# Patient Record
Sex: FEMALE | Race: WHITE | NOT HISPANIC OR LATINO
[De-identification: names, ages, dates, MRNs, and addresses within clinical notes are randomized per-mention and may not be internally consistent; named-entity substitution may affect disease eponyms.]

---

## 2019-10-24 ENCOUNTER — APPOINTMENT (OUTPATIENT)
Dept: INTERNAL MEDICINE | Facility: CLINIC | Age: 79
End: 2019-10-24
Payer: MEDICARE

## 2019-10-24 ENCOUNTER — NON-APPOINTMENT (OUTPATIENT)
Age: 79
End: 2019-10-24

## 2019-10-24 VITALS
OXYGEN SATURATION: 98 % | WEIGHT: 178 LBS | TEMPERATURE: 98.1 F | BODY MASS INDEX: 31.54 KG/M2 | SYSTOLIC BLOOD PRESSURE: 199 MMHG | HEART RATE: 87 BPM | DIASTOLIC BLOOD PRESSURE: 73 MMHG | HEIGHT: 63 IN

## 2019-10-24 VITALS — SYSTOLIC BLOOD PRESSURE: 150 MMHG | DIASTOLIC BLOOD PRESSURE: 80 MMHG

## 2019-10-24 DIAGNOSIS — Z80.0 FAMILY HISTORY OF MALIGNANT NEOPLASM OF DIGESTIVE ORGANS: ICD-10-CM

## 2019-10-24 DIAGNOSIS — Z81.1 FAMILY HISTORY OF ALCOHOL ABUSE AND DEPENDENCE: ICD-10-CM

## 2019-10-24 DIAGNOSIS — Z78.9 OTHER SPECIFIED HEALTH STATUS: ICD-10-CM

## 2019-10-24 DIAGNOSIS — Z63.4 DISAPPEARANCE AND DEATH OF FAMILY MEMBER: ICD-10-CM

## 2019-10-24 DIAGNOSIS — Z80.1 FAMILY HISTORY OF MALIGNANT NEOPLASM OF TRACHEA, BRONCHUS AND LUNG: ICD-10-CM

## 2019-10-24 PROCEDURE — 93000 ELECTROCARDIOGRAM COMPLETE: CPT

## 2019-10-24 PROCEDURE — 99204 OFFICE O/P NEW MOD 45 MIN: CPT | Mod: 25

## 2019-10-24 RX ORDER — MELATONIN 3 MG
TABLET ORAL
Refills: 0 | Status: ACTIVE | COMMUNITY

## 2019-10-24 RX ORDER — UBIDECARENONE 100 MG
100 CAPSULE ORAL
Refills: 0 | Status: ACTIVE | COMMUNITY

## 2019-10-24 SDOH — SOCIAL STABILITY - SOCIAL INSECURITY: DISSAPEARANCE AND DEATH OF FAMILY MEMBER: Z63.4

## 2019-11-20 ENCOUNTER — MEDICATION RENEWAL (OUTPATIENT)
Age: 79
End: 2019-11-20

## 2019-11-26 ENCOUNTER — MEDICATION RENEWAL (OUTPATIENT)
Age: 79
End: 2019-11-26

## 2019-12-29 ENCOUNTER — FORM ENCOUNTER (OUTPATIENT)
Age: 79
End: 2019-12-29

## 2019-12-30 ENCOUNTER — OUTPATIENT (OUTPATIENT)
Dept: OUTPATIENT SERVICES | Facility: HOSPITAL | Age: 79
LOS: 1 days | End: 2019-12-30
Payer: MEDICARE

## 2019-12-30 ENCOUNTER — APPOINTMENT (OUTPATIENT)
Dept: ULTRASOUND IMAGING | Facility: CLINIC | Age: 79
End: 2019-12-30
Payer: MEDICARE

## 2019-12-30 ENCOUNTER — APPOINTMENT (OUTPATIENT)
Dept: INTERNAL MEDICINE | Facility: CLINIC | Age: 79
End: 2019-12-30
Payer: MEDICARE

## 2019-12-30 VITALS — DIASTOLIC BLOOD PRESSURE: 80 MMHG | SYSTOLIC BLOOD PRESSURE: 150 MMHG

## 2019-12-30 VITALS
HEIGHT: 63 IN | HEART RATE: 103 BPM | OXYGEN SATURATION: 98 % | SYSTOLIC BLOOD PRESSURE: 150 MMHG | DIASTOLIC BLOOD PRESSURE: 80 MMHG | BODY MASS INDEX: 31.01 KG/M2 | TEMPERATURE: 98.4 F | WEIGHT: 175 LBS

## 2019-12-30 DIAGNOSIS — R10.11 RIGHT UPPER QUADRANT PAIN: ICD-10-CM

## 2019-12-30 DIAGNOSIS — Z76.89 PERSONS ENCOUNTERING HEALTH SERVICES IN OTHER SPECIFIED CIRCUMSTANCES: ICD-10-CM

## 2019-12-30 PROCEDURE — 99215 OFFICE O/P EST HI 40 MIN: CPT

## 2019-12-30 PROCEDURE — 76700 US EXAM ABDOM COMPLETE: CPT

## 2019-12-30 PROCEDURE — 76700 US EXAM ABDOM COMPLETE: CPT | Mod: 26

## 2019-12-30 RX ORDER — BLOOD SUGAR DIAGNOSTIC
STRIP MISCELLANEOUS
Qty: 100 | Refills: 0 | Status: ACTIVE | COMMUNITY
Start: 2019-07-16

## 2020-01-03 ENCOUNTER — OUTPATIENT (OUTPATIENT)
Dept: OUTPATIENT SERVICES | Facility: HOSPITAL | Age: 80
LOS: 1 days | End: 2020-01-03
Payer: MEDICARE

## 2020-01-03 DIAGNOSIS — R10.11 RIGHT UPPER QUADRANT PAIN: ICD-10-CM

## 2020-01-03 PROCEDURE — 78227 HEPATOBIL SYST IMAGE W/DRUG: CPT | Mod: 26

## 2020-01-03 PROCEDURE — 78227 HEPATOBIL SYST IMAGE W/DRUG: CPT

## 2020-01-03 PROCEDURE — A9537: CPT

## 2020-01-03 RX ORDER — SINCALIDE 5 UG/5ML
1.6 INJECTION, POWDER, LYOPHILIZED, FOR SOLUTION INTRAVENOUS ONCE
Refills: 0 | Status: COMPLETED | OUTPATIENT
Start: 2020-01-03 | End: 2020-01-03

## 2020-01-03 RX ADMIN — SINCALIDE 51.6 MICROGRAM(S): 5 INJECTION, POWDER, LYOPHILIZED, FOR SOLUTION INTRAVENOUS at 11:52

## 2020-01-06 ENCOUNTER — TRANSCRIPTION ENCOUNTER (OUTPATIENT)
Age: 80
End: 2020-01-06

## 2020-01-27 ENCOUNTER — APPOINTMENT (OUTPATIENT)
Dept: INTERNAL MEDICINE | Facility: CLINIC | Age: 80
End: 2020-01-27

## 2020-02-19 ENCOUNTER — INPATIENT (INPATIENT)
Facility: HOSPITAL | Age: 80
LOS: 6 days | Discharge: HOME | DRG: 372 | End: 2020-02-26
Attending: SPECIALIST | Admitting: SPECIALIST
Payer: MEDICARE

## 2020-02-19 VITALS
WEIGHT: 158.95 LBS | TEMPERATURE: 98 F | HEART RATE: 89 BPM | DIASTOLIC BLOOD PRESSURE: 63 MMHG | OXYGEN SATURATION: 99 % | SYSTOLIC BLOOD PRESSURE: 148 MMHG | HEIGHT: 64 IN | RESPIRATION RATE: 16 BRPM

## 2020-02-19 DIAGNOSIS — K65.1 PERITONEAL ABSCESS: ICD-10-CM

## 2020-02-19 DIAGNOSIS — Z98.51 TUBAL LIGATION STATUS: Chronic | ICD-10-CM

## 2020-02-19 LAB
ALBUMIN SERPL ELPH-MCNC: 3.1 G/DL — LOW (ref 3.3–5)
ALP SERPL-CCNC: 86 U/L — SIGNIFICANT CHANGE UP (ref 40–120)
ALT FLD-CCNC: 17 U/L — SIGNIFICANT CHANGE UP (ref 12–78)
ANION GAP SERPL CALC-SCNC: 10 MMOL/L — SIGNIFICANT CHANGE UP (ref 5–17)
APTT BLD: 29.5 SEC — SIGNIFICANT CHANGE UP (ref 28.5–37)
AST SERPL-CCNC: 15 U/L — SIGNIFICANT CHANGE UP (ref 15–37)
BASOPHILS # BLD AUTO: 0.06 K/UL — SIGNIFICANT CHANGE UP (ref 0–0.2)
BASOPHILS NFR BLD AUTO: 0.4 % — SIGNIFICANT CHANGE UP (ref 0–2)
BILIRUB SERPL-MCNC: 0.5 MG/DL — SIGNIFICANT CHANGE UP (ref 0.2–1.2)
BUN SERPL-MCNC: 10 MG/DL — SIGNIFICANT CHANGE UP (ref 7–23)
CALCIUM SERPL-MCNC: 9.9 MG/DL — SIGNIFICANT CHANGE UP (ref 8.5–10.1)
CHLORIDE SERPL-SCNC: 101 MMOL/L — SIGNIFICANT CHANGE UP (ref 96–108)
CO2 SERPL-SCNC: 26 MMOL/L — SIGNIFICANT CHANGE UP (ref 22–31)
CREAT SERPL-MCNC: 0.84 MG/DL — SIGNIFICANT CHANGE UP (ref 0.5–1.3)
EOSINOPHIL # BLD AUTO: 0.03 K/UL — SIGNIFICANT CHANGE UP (ref 0–0.5)
EOSINOPHIL NFR BLD AUTO: 0.2 % — SIGNIFICANT CHANGE UP (ref 0–6)
GLUCOSE SERPL-MCNC: 137 MG/DL — HIGH (ref 70–99)
HCT VFR BLD CALC: 35.4 % — SIGNIFICANT CHANGE UP (ref 34.5–45)
HGB BLD-MCNC: 11.6 G/DL — SIGNIFICANT CHANGE UP (ref 11.5–15.5)
IMM GRANULOCYTES NFR BLD AUTO: 0.8 % — SIGNIFICANT CHANGE UP (ref 0–1.5)
INR BLD: 1.37 RATIO — HIGH (ref 0.88–1.16)
LACTATE SERPL-SCNC: 1.9 MMOL/L — SIGNIFICANT CHANGE UP (ref 0.7–2)
LACTATE SERPL-SCNC: 2.2 MMOL/L — HIGH (ref 0.7–2)
LYMPHOCYTES # BLD AUTO: 1.18 K/UL — SIGNIFICANT CHANGE UP (ref 1–3.3)
LYMPHOCYTES # BLD AUTO: 8.2 % — LOW (ref 13–44)
MCHC RBC-ENTMCNC: 29.6 PG — SIGNIFICANT CHANGE UP (ref 27–34)
MCHC RBC-ENTMCNC: 32.8 GM/DL — SIGNIFICANT CHANGE UP (ref 32–36)
MCV RBC AUTO: 90.3 FL — SIGNIFICANT CHANGE UP (ref 80–100)
MONOCYTES # BLD AUTO: 1.14 K/UL — HIGH (ref 0–0.9)
MONOCYTES NFR BLD AUTO: 7.9 % — SIGNIFICANT CHANGE UP (ref 2–14)
MRSA PCR RESULT.: SIGNIFICANT CHANGE UP
NEUTROPHILS # BLD AUTO: 11.9 K/UL — HIGH (ref 1.8–7.4)
NEUTROPHILS NFR BLD AUTO: 82.5 % — HIGH (ref 43–77)
NRBC # BLD: 0 /100 WBCS — SIGNIFICANT CHANGE UP (ref 0–0)
PLATELET # BLD AUTO: 399 K/UL — SIGNIFICANT CHANGE UP (ref 150–400)
POTASSIUM SERPL-MCNC: 3.5 MMOL/L — SIGNIFICANT CHANGE UP (ref 3.5–5.3)
POTASSIUM SERPL-SCNC: 3.5 MMOL/L — SIGNIFICANT CHANGE UP (ref 3.5–5.3)
PROT SERPL-MCNC: 7.9 G/DL — SIGNIFICANT CHANGE UP (ref 6–8.3)
PROTHROM AB SERPL-ACNC: 15.5 SEC — HIGH (ref 10–12.9)
RBC # BLD: 3.92 M/UL — SIGNIFICANT CHANGE UP (ref 3.8–5.2)
RBC # FLD: 13.9 % — SIGNIFICANT CHANGE UP (ref 10.3–14.5)
S AUREUS DNA NOSE QL NAA+PROBE: SIGNIFICANT CHANGE UP
SODIUM SERPL-SCNC: 137 MMOL/L — SIGNIFICANT CHANGE UP (ref 135–145)
WBC # BLD: 14.42 K/UL — HIGH (ref 3.8–10.5)
WBC # FLD AUTO: 14.42 K/UL — HIGH (ref 3.8–10.5)

## 2020-02-19 PROCEDURE — 99284 EMERGENCY DEPT VISIT MOD MDM: CPT

## 2020-02-19 PROCEDURE — 99232 SBSQ HOSP IP/OBS MODERATE 35: CPT

## 2020-02-19 PROCEDURE — 77012 CT SCAN FOR NEEDLE BIOPSY: CPT | Mod: 26

## 2020-02-19 PROCEDURE — 99221 1ST HOSP IP/OBS SF/LOW 40: CPT

## 2020-02-19 PROCEDURE — 49406 IMAGE CATH FLUID PERI/RETRO: CPT

## 2020-02-19 PROCEDURE — 10160 PNXR ASPIR ABSC HMTMA BULLA: CPT

## 2020-02-19 RX ORDER — SODIUM CHLORIDE 9 MG/ML
1000 INJECTION, SOLUTION INTRAVENOUS
Refills: 0 | Status: DISCONTINUED | OUTPATIENT
Start: 2020-02-19 | End: 2020-02-20

## 2020-02-19 RX ORDER — INSULIN LISPRO 100/ML
VIAL (ML) SUBCUTANEOUS
Refills: 0 | Status: DISCONTINUED | OUTPATIENT
Start: 2020-02-19 | End: 2020-02-20

## 2020-02-19 RX ORDER — UBIDECARENONE 100 MG
0 CAPSULE ORAL
Qty: 0 | Refills: 0 | DISCHARGE

## 2020-02-19 RX ORDER — METOPROLOL TARTRATE 50 MG
0 TABLET ORAL
Qty: 0 | Refills: 0 | DISCHARGE

## 2020-02-19 RX ORDER — DEXTROSE 50 % IN WATER 50 %
12.5 SYRINGE (ML) INTRAVENOUS ONCE
Refills: 0 | Status: DISCONTINUED | OUTPATIENT
Start: 2020-02-19 | End: 2020-02-20

## 2020-02-19 RX ORDER — METOPROLOL TARTRATE 50 MG
200 TABLET ORAL DAILY
Refills: 0 | Status: DISCONTINUED | OUTPATIENT
Start: 2020-02-20 | End: 2020-02-26

## 2020-02-19 RX ORDER — ACETAMINOPHEN 500 MG
1000 TABLET ORAL ONCE
Refills: 0 | Status: COMPLETED | OUTPATIENT
Start: 2020-02-20 | End: 2020-02-20

## 2020-02-19 RX ORDER — ERGOCALCIFEROL 1.25 MG/1
0 CAPSULE ORAL
Qty: 0 | Refills: 0 | DISCHARGE

## 2020-02-19 RX ORDER — DEXTROSE 50 % IN WATER 50 %
25 SYRINGE (ML) INTRAVENOUS ONCE
Refills: 0 | Status: DISCONTINUED | OUTPATIENT
Start: 2020-02-19 | End: 2020-02-20

## 2020-02-19 RX ORDER — DEXTROSE MONOHYDRATE, SODIUM CHLORIDE, AND POTASSIUM CHLORIDE 50; .745; 4.5 G/1000ML; G/1000ML; G/1000ML
1000 INJECTION, SOLUTION INTRAVENOUS
Refills: 0 | Status: DISCONTINUED | OUTPATIENT
Start: 2020-02-19 | End: 2020-02-22

## 2020-02-19 RX ORDER — AMLODIPINE BESYLATE 2.5 MG/1
10 TABLET ORAL DAILY
Refills: 0 | Status: DISCONTINUED | OUTPATIENT
Start: 2020-02-19 | End: 2020-02-26

## 2020-02-19 RX ORDER — ACETAMINOPHEN 500 MG
1000 TABLET ORAL ONCE
Refills: 0 | Status: DISCONTINUED | OUTPATIENT
Start: 2020-02-19 | End: 2020-02-20

## 2020-02-19 RX ORDER — SODIUM CHLORIDE 9 MG/ML
2200 INJECTION INTRAMUSCULAR; INTRAVENOUS; SUBCUTANEOUS ONCE
Refills: 0 | Status: COMPLETED | OUTPATIENT
Start: 2020-02-19 | End: 2020-02-19

## 2020-02-19 RX ORDER — KETOROLAC TROMETHAMINE 30 MG/ML
30 SYRINGE (ML) INJECTION EVERY 8 HOURS
Refills: 0 | Status: DISCONTINUED | OUTPATIENT
Start: 2020-02-19 | End: 2020-02-23

## 2020-02-19 RX ORDER — GLUCAGON INJECTION, SOLUTION 0.5 MG/.1ML
1 INJECTION, SOLUTION SUBCUTANEOUS ONCE
Refills: 0 | Status: DISCONTINUED | OUTPATIENT
Start: 2020-02-19 | End: 2020-02-20

## 2020-02-19 RX ORDER — PIPERACILLIN AND TAZOBACTAM 4; .5 G/20ML; G/20ML
3.38 INJECTION, POWDER, LYOPHILIZED, FOR SOLUTION INTRAVENOUS ONCE
Refills: 0 | Status: COMPLETED | OUTPATIENT
Start: 2020-02-19 | End: 2020-02-19

## 2020-02-19 RX ORDER — LOSARTAN POTASSIUM 100 MG/1
100 TABLET, FILM COATED ORAL DAILY
Refills: 0 | Status: DISCONTINUED | OUTPATIENT
Start: 2020-02-19 | End: 2020-02-26

## 2020-02-19 RX ORDER — ATORVASTATIN CALCIUM 80 MG/1
0 TABLET, FILM COATED ORAL
Qty: 0 | Refills: 0 | DISCHARGE

## 2020-02-19 RX ORDER — DEXTROSE 50 % IN WATER 50 %
15 SYRINGE (ML) INTRAVENOUS ONCE
Refills: 0 | Status: DISCONTINUED | OUTPATIENT
Start: 2020-02-19 | End: 2020-02-20

## 2020-02-19 RX ORDER — KETOROLAC TROMETHAMINE 30 MG/ML
15 SYRINGE (ML) INJECTION EVERY 8 HOURS
Refills: 0 | Status: DISCONTINUED | OUTPATIENT
Start: 2020-02-19 | End: 2020-02-23

## 2020-02-19 RX ORDER — METFORMIN HYDROCHLORIDE 850 MG/1
0 TABLET ORAL
Qty: 0 | Refills: 0 | DISCHARGE

## 2020-02-19 RX ORDER — METOPROLOL TARTRATE 50 MG
100 TABLET ORAL AT BEDTIME
Refills: 0 | Status: DISCONTINUED | OUTPATIENT
Start: 2020-02-19 | End: 2020-02-26

## 2020-02-19 RX ORDER — PIPERACILLIN AND TAZOBACTAM 4; .5 G/20ML; G/20ML
3.38 INJECTION, POWDER, LYOPHILIZED, FOR SOLUTION INTRAVENOUS EVERY 8 HOURS
Refills: 0 | Status: DISCONTINUED | OUTPATIENT
Start: 2020-02-19 | End: 2020-02-26

## 2020-02-19 RX ORDER — AMLODIPINE BESYLATE 2.5 MG/1
0 TABLET ORAL
Qty: 0 | Refills: 0 | DISCHARGE

## 2020-02-19 RX ORDER — LOSARTAN POTASSIUM 100 MG/1
0 TABLET, FILM COATED ORAL
Qty: 0 | Refills: 0 | DISCHARGE

## 2020-02-19 RX ADMIN — DEXTROSE MONOHYDRATE, SODIUM CHLORIDE, AND POTASSIUM CHLORIDE 100 MILLILITER(S): 50; .745; 4.5 INJECTION, SOLUTION INTRAVENOUS at 23:45

## 2020-02-19 RX ADMIN — PIPERACILLIN AND TAZOBACTAM 25 GRAM(S): 4; .5 INJECTION, POWDER, LYOPHILIZED, FOR SOLUTION INTRAVENOUS at 22:50

## 2020-02-19 RX ADMIN — SODIUM CHLORIDE 2200 MILLILITER(S): 9 INJECTION INTRAMUSCULAR; INTRAVENOUS; SUBCUTANEOUS at 12:35

## 2020-02-19 RX ADMIN — PIPERACILLIN AND TAZOBACTAM 200 GRAM(S): 4; .5 INJECTION, POWDER, LYOPHILIZED, FOR SOLUTION INTRAVENOUS at 15:24

## 2020-02-19 NOTE — H&P ADULT - HISTORY OF PRESENT ILLNESS
78yo female with pmhx of htn, dm, here for 1 month history of abdominal pain and diagnosis of abscess.  Pt states initial pain began around thanksgiving.  Had an episode, described as RUQ pain, but went away quickly.  Had a second episode a few days later, and again went away quickly.  Pt then had another attack around [facility].  States pain never went away.  Went to , who worked patient up for gallbladder disease. Per daughter, all labs at that time were normal.  Send for HIDA scan and again reported as negative.  Pt then states she found the pain moved from RUQ to right side, and then down to RLQ.  Pt states she had 30 days of amoxicillin, drain placed.  States CT scan performed just before drain removed showed resolution of abscess.  Once drain removed, patient and daughter state that patient began having abdominal pain in RLQ again and began running fevers.  PT states last fever was Saturday 2/15/20, and was 102+.  Currently afebrile.  Pt states pain is described as painful.  States it is 7-8/10 in severity most of the time.  States pain does not radiate.  States it is helped by holding her lower abdomen.  States walking also helps.  States pain is made worse with laying down, and has had increasing difficulty getting comfortable at night.  Denies urinary symptoms, hematuria, bowel changes, diarrhea, SOB, chest pain, associated back pain, difficulty breathing, lower leg/calf tenderness, N/V.

## 2020-02-19 NOTE — CONSULT NOTE ADULT - SUBJECTIVE AND OBJECTIVE BOX
REASON FOR CONSULT: Rigors    CONSULT REQUESTED BY: Dr. Singh    Patient is a 79y old  Female who presents with a chief complaint of Abdominal abscess (2020 16:41)      BRIEF HOSPITAL COURSE:  Patient is a 79 year old female with a pmhx of HTN, HLD, recent abscess requiring abx and drain who presents to [facility] for right sided abdominal pain. Of note patient recently worked up at the start of january for presumed gallbladder disease. CT abd at that time found multiple abscess, which required 10 days of amoxicillin (just finished course 1 week ago) and intraabdominal drain (was in for 9 days back in early january). Abscess thought to have improved, but abdominal pain returned several days ago.  Pt states last fever was Saturday 2/15/20, and was 102+.  Patient had outpatient CT done which showed that abscess     PAST MEDICAL & SURGICAL HISTORY:  Hyperlipidemia  Diabetes  Hypertension  H/O tubal ligation    Allergies    Demerol (Anaphylaxis)    Intolerances      FAMILY HISTORY:  FH: CAD (coronary artery disease): father-  at 48  Family hx of colon cancer: mother-  from      Review of Systems:  CONSTITUTIONAL: No fever, chills, or fatigue  EYES: No eye pain, visual disturbances, or discharge  ENMT:  No difficulty hearing, tinnitus, vertigo; No sinus or throat pain  NECK: No pain or stiffness  RESPIRATORY: No cough, wheezing, chills or hemoptysis; No shortness of breath  CARDIOVASCULAR: No chest pain, palpitations, dizziness, or leg swelling  GASTROINTESTINAL: No abdominal or epigastric pain. No nausea, vomiting, or hematemesis; No diarrhea or constipation. No melena or hematochezia.  GENITOURINARY: No dysuria, frequency, hematuria, or incontinence  NEUROLOGICAL: No headaches, memory loss, loss of strength, numbness, or tremors  SKIN: No itching, burning, rashes, or lesions   MUSCULOSKELETAL: No joint pain or swelling; No muscle, back, or extremity pain  PSYCHIATRIC: No depression, anxiety, mood swings, or difficulty sleeping      Medications:  piperacillin/tazobactam IVPB.. 3.375 Gram(s) IV Intermittent every 8 hours                                  ICU Vital Signs Last 24 Hrs  T(C): 36.9 (2020 15:15), Max: 36.9 (2020 10:44)  T(F): 98.4 (2020 15:15), Max: 98.4 (2020 10:44)  HR: 88 (2020 15:15) (88 - 89)  BP: 154/80 (2020 15:15) (148/63 - 154/80)  BP(mean): --  ABP: --  ABP(mean): --  RR: 15 (2020 15:15) (15 - 16)  SpO2: 97% (2020 15:15) (97% - 99%)    Vital Signs Last 24 Hrs  T(C): 36.9 (2020 15:15), Max: 36.9 (2020 10:44)  T(F): 98.4 (2020 15:15), Max: 98.4 (2020 10:44)  HR: 88 (2020 15:15) (88 - 89)  BP: 154/80 (2020 15:15) (148/63 - 154/80)  BP(mean): --  RR: 15 (2020 15:15) (15 - 16)  SpO2: 97% (2020 15:15) (97% - 99%)        I&O's Detail        LABS:                        11.6   14.42 )-----------( 399      ( 2020 11:50 )             35.4     02-    137  |  101  |  10  ----------------------------<  137<H>  3.5   |  26  |  0.84    Ca    9.9      2020 11:50    TPro  7.9  /  Alb  3.1<L>  /  TBili  0.5  /  DBili  x   /  AST  15  /  ALT  17  /  AlkPhos  86  02-19          CAPILLARY BLOOD GLUCOSE      POCT Blood Glucose.: 140 mg/dL (2020 16:02)    PT/INR - ( 2020 11:50 )   PT: 15.5 sec;   INR: 1.37 ratio         PTT - ( 2020 11:50 )  PTT:29.5 sec    CULTURES:      Physical Examination:    General: No acute distress.  Alert, oriented, interactive, nonfocal    HEENT: Pupils equal, reactive to light.  Symmetric.    PULM: Clear to auscultation bilaterally, no significant sputum production    CVS: Regular rate and rhythm, no murmurs, rubs, or gallops    ABD: Soft, nondistended, nontender, normoactive bowel sounds, no masses    EXT: No edema, nontender    SKIN: Warm and well perfused, no rashes noted.    RADIOLOGY: ***    CRITICAL CARE TIME SPENT: *** REASON FOR CONSULT: Rigors    CONSULT REQUESTED BY: Dr. Singh    Patient is a 79y old  Female who presents with a chief complaint of Abdominal abscess (2020 16:41)      BRIEF HOSPITAL COURSE:  Patient is a 79 year old female with a pmhx of HTN, HLD, recent abscess requiring abx and intra-abdominal drain who presents to [facility] for right sided abdominal pain. Of note patient recently worked up at the start of January for presumed gallbladder disease. CT abd at that time found multiple abscess, which required 30 days of amoxicillin (just finished course 1 week ago) and intraabdominal drain (was in for 9 days back in early January). Abscess thought to have improved, but abdominal pain returned several days ago.  Pt states last fever was Saturday 2/15/20, and was 102+.  Patient had outpatient CT done which showed that abscess have returned. Patient admitted, WBC 14K lactate 2.2 --> 1.9, now post op IR drainage of abdominal abscess. Called for rigors/shivering post op. Patient seen and examined Vital signs stable, not offering any complaints. Denies CP, abd pain, N/V/D, HA, dizziness and fever.    PAST MEDICAL & SURGICAL HISTORY:  Hyperlipidemia  Diabetes  Hypertension  H/O tubal ligation    Allergies    Demerol (Anaphylaxis)    Intolerances      FAMILY HISTORY:  FH: CAD (coronary artery disease): father-  at 48  Family hx of colon cancer: mother-  from    Social:   Denies history of tobacco, alcohol and drug abuse     Review of Systems:  CONSTITUTIONAL: No fever,  + chills, or fatigue  EYES: No eye pain, visual disturbances, or discharge  ENMT:  No difficulty hearing, tinnitus, vertigo; No sinus or throat pain  NECK: No pain or stiffness  RESPIRATORY: No cough, wheezing, chills or hemoptysis; No shortness of breath  CARDIOVASCULAR: No chest pain, palpitations, dizziness, or leg swelling  GASTROINTESTINAL: +abdominal no epigastric pain. No nausea, vomiting, or hematemesis; No diarrhea or constipation. No melena or hematochezia.  GENITOURINARY: No dysuria, frequency, hematuria, or incontinence  NEUROLOGICAL: No headaches, memory loss, loss of strength, numbness, or tremors  SKIN: No itching, burning, rashes, or lesions   MUSCULOSKELETAL: No joint pain or swelling; No muscle, back, or extremity pain  PSYCHIATRIC: No depression, anxiety, mood swings, or difficulty sleeping      Medications:  piperacillin/tazobactam IVPB.. 3.375 Gram(s) IV Intermittent every 8 evaristo              ICU Vital Signs Last 24 Hrs  T(C): 36.9 (2020 15:15), Max: 36.9 (2020 10:44)  T(F): 98.4 (2020 15:15), Max: 98.4 (2020 10:44)  HR: 88 (2020 15:15) (88 - 89)  BP: 154/80 (2020 15:15) (148/63 - 154/80)  RR: 15 (2020 15:15) (15 - 16)  SpO2: 97% (2020 15:15) (97% - 99%)        I&O's Detail        LABS:                        11.6   14.42 )-----------( 399      ( 2020 11:50 )             35.4     -    137  |  101  |  10  ----------------------------<  137<H>  3.5   |  26  |  0.84    Ca    9.9      2020 11:50    TPro  7.9  /  Alb  3.1<L>  /  TBili  0.5  /  DBili  x   /  AST  15  /  ALT  17  /  AlkPhos  86  -          CAPILLARY BLOOD GLUCOSE      POCT Blood Glucose.: 140 mg/dL (2020 16:02)    PT/INR - ( 2020 11:50 )   PT: 15.5 sec;   INR: 1.37 ratio         PTT - ( 2020 11:50 )  PTT:29.5 sec    CULTURES:      Physical Examination:    General: No acute distress, resting comfortably in bed    HEENT: Pupils equal, reactive to light.  Symmetric. No JVD    PULM: Clear to auscultation bilaterally, no significant sputum production    CVS: Regular rate and rhythm, no murmurs, rubs, or gallops    ABD: Soft, nondistended, mildly tender, normoactive bowel sounds, no masses, intra-abdomen and subcutaneous drain in place    EXT: No edema, nontender    SKIN: Warm and well perfused    Neuro: alert and oriented x 3, face symmetric,     RADIOLOGY:   < from: CT Aspiration Abscess Hematoma, Cyst (20 @ 17:26) >  NTERPRETATION:    CT scan guided right flank and right intra-abdominal abscess aspiration  and drainage catheter placement :  CT abscessogram:  Clinical History:    Abdominal abscess. Patient had a drainage catheter placed in outside facility and discontinued the catheter of the follow-up CT scan and resolution of abscess.    However, patient had fever, follow-up CT showed multiple right flank collections with thick wall suggestive of abscesses in the right flank subcutaneous plane, right intra-abdominal retrocecal collections.    Image guided procedure requested by Dr. Singh .    Technique:    An informed consent obtained from the patient inthe intervention radiology suite in presence of intervention radiology nurse  . Patient placed supine on the CT scan table. Limited noncontrast CT abdomen skin markers showed collections. The skin sites marked. Time out procedure was performed. The marked site prepped and draped in sterile fashion. Under noncontrast limited CT abdomen the subcutaneous abscess was accessed after anesthetizing the tract with 1% lidocaine.  The micropuncture needle was exchanged over a microwire to a microcatheter. 5 cc of thick pus was aspirated. Fluid sent to the lab for further analysis  subsequently, microcatheter exchanged over a Bentson wire to a 8 Canadian fascial dilator, to a 8 Canadian multipurpose pigtail drainage catheter lies in the subcutaneous collection. Approximately 15 cc of thick pus aspirated.. The catheter anchored to the skin using StatLock device. The abscess cavity was irrigated with normal saline till the returns were clear. . Hemostasis secured by manual compression.   Following similarsteps, Limited noncontrast CT abdomen skin markers showed collections. The skin sites marked. Under noncontrast limited CT abdomen the intra-abdominal collection was accessed after anesthetizing the tract with 1% lidocaine.  The micropuncture needle was exchanged over a microwire to a microcatheter. 5 cc of thick pus was aspirated. Fluid sent to the lab for further analysis  subsequently, microcatheter exchanged over a Bentson wire to a 8 Canadian fascial dilator, to a 8 Canadian multipurpose pigtaildrainage catheter. Approximately 15 cc of thick pus aspirated.. The catheter anchored to the skin using StatLock device. The abscess cavity was irrigated with normal saline till the returns were clear. . Hemostasis secured by manual compression.  At this time, approximately 45 cc of nonionic contrast Omnipaque injected through the intra-abdominal abscess drainage catheter. Follow-up CT scan was done to assess the communication of abscess.    Patient tolerated the procedure well with 1% lidocaineand sedation provided by anesthesiologist . No complications noted during the procedure. Immediately after the procedure. Patient transferred to the recovery unit in stable condition for observation and management Patient's vital signs were monitoredthroughout the procedure.  Patient had chills and right cusp postprocedure in the recovery unit.. Vital signs were normal. Temperature 98.2, respiratory rate 20, oxygen saturation 97%. Blood pressure 157 x 65 mmHg. Dr. Singh informed at 5:50 PM.  Findings:    Right flank subcutaneous abscess with thick pus. Fluid sent for lab analysis. Approximately 15 cc of thick pus aspirated.    The right lower quadrant intra-abdominal abscesses show communication on contrast abscessogram. Approximately 15 ccof thick pus aspirated from this abscess as well. Abscess collection sent for lab analysis.    Impression:    Successful CT-guided right flank subcutaneous and intra-abdominal abscess drainage catheters placement. Pus sent for lab analysis into separate containers

## 2020-02-19 NOTE — BRIEF OPERATIVE NOTE - OPERATION/FINDINGS
Right lower quadrant abdominal collections. 8 fr drainage catheter ( Labeled 1 ) and intra-abdominal collection drainage catheter ( Labeled 2 ) placement under CT scan guidance. Sedation provided by anesthesiologist.

## 2020-02-19 NOTE — ED PROVIDER NOTE - ATTENDING CONTRIBUTION TO CARE
I have personally performed a face to face diagnostic evaluation on this patient.  I have reviewed the PA note and agree with the history, exam, and plan of care, except as noted.  History and Exam by me shows  referral by pmd to see [facility] for intraabdominal abscess.   pt is in nad.  abd- soft, rlq tenderness.  wbc 14. admit to [facility]'s service for further management.

## 2020-02-19 NOTE — H&P ADULT - NSICDXFAMILYHX_GEN_ALL_CORE_FT
FAMILY HISTORY:  Family hx of colon cancer, mother-  from  FH: CAD (coronary artery disease), father-  at 48

## 2020-02-19 NOTE — H&P ADULT - NSHPPHYSICALEXAM_GEN_ALL_CORE
General: WNL, AAOx3  HEENT: WNL  Neck: WNL  Lungs: Clear to auscultation B/l No wheezes, Rhonchi, rales  Cardiac: Clear S1, S2. No murmurs, No S3, S4  Abdomen: + swelling to RLQ.  Healed site where drain placed.  BS+ soft + tenderness to RLQ.  + referred pain from left lower quadrant to right lower quadrant.  No guarding.   Musculoskeletal: Normal gait, WNL.  Negative Homans sign  Lymph system: WNL  Neuro: WNL

## 2020-02-19 NOTE — PATIENT PROFILE ADULT - LIVING ENVIRONMENT
RECEIVED FROM PACU PER BED, PT DROWSY BUT AROUSES TO VERBAL STIMULI, O2 ON AT 4 LITERS AND SATS BETWEEN 89 AND 91, RESPIRATORY CALLED FOR CPAP AS PT USES CPAP AT HOME WHEN SHE SLEEPS, HEART RATE STABLE, ABD SOFT, DENIES NAUSEA, RARE BOWEL SOUNDS NOTED, 4 A no

## 2020-02-19 NOTE — H&P ADULT - NSHPSOCIALHISTORY_GEN_ALL_CORE
No etoh use  No illicit drug use  No caffeine use  Does not smoke  Lives alone- nearby to daughter and son

## 2020-02-19 NOTE — ED PROVIDER NOTE - PROGRESS NOTE DETAILS
Elevated WBC and lactate noted. Contacted surgery JOANA Man- aware of results. States that after consulting ID, they will be holding out on abx pending faxed report of previous blood cultures. JOANA Man states fax should arrive in ~10 mins and then she will place appropriate abx order. Fluid bolus ordered.

## 2020-02-19 NOTE — CONSULT NOTE ADULT - SUBJECTIVE AND OBJECTIVE BOX
Patient is a 79y old  Female who presents with a chief complaint of Abdominal abscess (2020 12:53)      HPI:  78yo female with pmhx of htn, dm, here for 1 month history of abdominal pain and diagnosis of abscess.  Pt states initial pain began around giving.  Had an episode, described as RUQ pain, but went away quickly.  Had a second episode a few days later, and again went away quickly.  Pt then had another attack around [place].  States pain never went away.  Went to , who worked patient up for gallbladder disease. Per daughter, all labs at that time were normal.  Send for HIDA scan and again reported as negative.  Pt then states she found the pain moved from RUQ to right side, and then down to RLQ.  Pt states she had 30 days of amoxicillin, drain placed.  States CT scan performed just before drain removed showed resolution of abscess.  Once drain removed, patient and daughter state that patient began having abdominal pain in RLQ again and began running fevers.  PT states last fever was Saturday 2/15/20, and was 102+.  Currently afebrile.  Pt states pain is described as painful.  States it is 7-8/10 in severity most of the time.  States pain does not radiate.  States it is helped by holding her lower abdomen.  States walking also helps.  States pain is made worse with laying down, and has had increasing difficulty getting comfortable at night.  Denies urinary symptoms, hematuria, bowel changes, diarrhea, SOB, chest pain, associated back pain, difficulty breathing, lower leg/calf tenderness, N/V. (2020 12:53).     Pt has been on Ampicillin po for the last few days    Asked to see patient for ID Consult    Allergies    Demerol (Anaphylaxis)    Intolerances        MEDICATIONS  (STANDING):    MEDICATIONS  (PRN):      PAST MEDICAL & SURGICAL HISTORY:  Hyperlipidemia  Diabetes  Hypertension  H/O tubal ligation      FAMILY HISTORY:  FH: CAD (coronary artery disease): father-  at 48  Family hx of colon cancer: mother-  from      Social History    Smoking History:  YES[  ]  NO[  ]   UNKNOWN[x  ]    REVIEW OF SYSTEMS:  All systems below were reviewed and are normal [  ]  Constitutional:  HEENT:  Lymph nodes:  ID:  Pulmonary:no cough sob  Cardiac:no CP  GI:no NVD. + abd pain and swelling R flank/abd  Renal:  Musculoskeletal:  Neuro:  Endocrine:  Skin:  All other systems above were reviewed and are normal   [ x ]        Vital Signs Last 24 Hrs  T(C): 36.9 (2020 15:15), Max: 36.9 (2020 10:44)  T(F): 98.4 (2020 15:15), Max: 98.4 (2020 10:44)  HR: 88 (2020 15:15) (88 - 89)  BP: 154/80 (2020 15:15) (148/63 - 154/80)  BP(mean): --  RR: 15 (2020 15:15) (15 - 16)  SpO2: 97% (2020 15:15) (97% - 99%)    PHYSICAL EXAM:  Constitutional:  HEENT:  Neck:  Lymph Nodes:  Lungs:clear  Heart:rr  Abdomen:soft. + painful swelling R flank/abd  Renal:  Extremities:  Neurologic:  Vascular:  Endocrine:  Skin:  Except for written comments, all of above normal [x  ]    I&O's Summary      LABORATORY:    CBC Full  -  ( 2020 11:50 )  WBC Count : 14.42 K/uL  RBC Count : 3.92 M/uL  Hemoglobin : 11.6 g/dL  Hematocrit : 35.4 %  Platelet Count - Automated : 399 K/uL  Mean Cell Volume : 90.3 fl  Mean Cell Hemoglobin : 29.6 pg  Mean Cell Hemoglobin Concentration : 32.8 gm/dL  Auto Neutrophil # : 11.90 K/uL  Auto Lymphocyte # : 1.18 K/uL  Auto Monocyte # : 1.14 K/uL  Auto Eosinophil # : 0.03 K/uL  Auto Basophil # : 0.06 K/uL  Auto Neutrophil % : 82.5 %  Auto Lymphocyte % : 8.2 %  Auto Monocyte % : 7.9 %  Auto Eosinophil % : 0.2 %  Auto Basophil % : 0.4 %      02-19    137  |  101  |  10  ----------------------------<  137<H>  3.5   |  26  |  0.84    Ca    9.9      2020 11:50    TPro  7.9  /  Alb  3.1<L>  /  TBili  0.5  /  DBili  x   /  AST  15  /  ALT  17  /  AlkPhos  86  -                                          Vanco. trough:  Genta trough:    Radiology:CT abd/pelvis to be read by Rad but does appear to show 3 intraabd collections complatible with absc. ? perf AP    Microbiology: pending

## 2020-02-19 NOTE — CHART NOTE - NSCHARTNOTEFT_GEN_A_CORE
Called by IR pacu RN, pt c/o "shivering" s/p IR drainage of abdominal collection. Pt seen and examined at bedside. PT states she "feels cold" and is shivering despite using blankets after having IR drain her abdominal abscess and leave a drain in place. Pt denies any chest pain, SOB, palpitations, N/V, fevers, chills, abdominal pain.     REVIEW OF SYSTEMS:    CONSTITUTIONAL: No weakness, fevers or chills  EYES/ENT: No visual changes;  No vertigo or throat pain   NECK: No pain or stiffness  RESPIRATORY: No cough, wheezing, hemoptysis; No shortness of breath  CARDIOVASCULAR: No chest pain or palpitations  GASTROINTESTINAL: No abdominal or epigastric pain. No nausea, vomiting, or hematemesis; No diarrhea or constipation. No melena or hematochezia.  GENITOURINARY: No dysuria, frequency or hematuria  NEUROLOGICAL: No numbness or weakness  SKIN: No itching, burning, rashes, or lesions   All other review of systems is negative unless indicated above.      ICU Vital Signs Last 24 Hrs  T(C): 36.9 (19 Feb 2020 15:15), Max: 36.9 (19 Feb 2020 10:44)  T(F): 98.4 (19 Feb 2020 15:15), Max: 98.4 (19 Feb 2020 10:44)  HR: 88 (19 Feb 2020 15:15) (88 - 89)  BP: 154/80 (19 Feb 2020 15:15) (148/63 - 154/80)  BP(mean): --  ABP: --  ABP(mean): --  RR: 15 (19 Feb 2020 15:15) (15 - 16)  SpO2: 97% (19 Feb 2020 15:15) (97% - 99%)    PHYSICAL EXAM:  GENERAL: NAD, well-developed  CHEST/LUNG: CTABl, no ronchi, rales or wheezing  HEART: RRR, no MRG  ABDOMEN: Soft, minimally ttp around drain site, nondistended, +bs x 4 quadrants, drain in place  EXTREMITIES:  2+ Peripheral Pulses, No clubbing, cyanosis, or edema, no calf tenderness  PSYCH: AAOx3  NEUROLOGY: non-focal  SKIN: No rashes or lesions      Assessment:  78 y/o F admitted for recurrent abdominal abscess, s/p IR drainage with 8 [place] left in place, c/o "shivering and feeling cold", currently afebrile, with VSS,    Plan:  - ICU consulted,  - warming blankets  - serial abdominal exams  - trend vitals  - blood cultures ordered, f/u  - RN to call for any changes Called by IR pacu RN, pt c/o "shivering" s/p IR drainage of abdominal collection. Pt seen and examined at bedside, in IR recovery, in NAD. PT states she "feels cold" and is shivering despite using blankets after having IR drain her abdominal abscess and leave a drain in place. Pt denies any chest pain, SOB, palpitations, N/V, fevers, chills, abdominal pain.     REVIEW OF SYSTEMS:    CONSTITUTIONAL: No weakness, fevers or chills  EYES/ENT: No visual changes;  No vertigo or throat pain   NECK: No pain or stiffness  RESPIRATORY: No cough, wheezing, hemoptysis; No shortness of breath  CARDIOVASCULAR: No chest pain or palpitations  GASTROINTESTINAL: No abdominal or epigastric pain. No nausea, vomiting, or hematemesis; No diarrhea or constipation. No melena or hematochezia.  GENITOURINARY: No dysuria, frequency or hematuria  NEUROLOGICAL: No numbness or weakness  SKIN: No itching, burning, rashes, or lesions   All other review of systems is negative unless indicated above.      ICU Vital Signs Last 24 Hrs  T(C): 36.9 (19 Feb 2020 15:15), Max: 36.9 (19 Feb 2020 10:44)  T(F): 98.4 (19 Feb 2020 15:15), Max: 98.4 (19 Feb 2020 10:44)  HR: 88 (19 Feb 2020 15:15) (88 - 89)  BP: 154/80 (19 Feb 2020 15:15) (148/63 - 154/80)  BP(mean): --  ABP: --  ABP(mean): --  RR: 15 (19 Feb 2020 15:15) (15 - 16)  SpO2: 97% (19 Feb 2020 15:15) (97% - 99%)    PHYSICAL EXAM:  GENERAL: NAD, well-developed  CHEST/LUNG: CTABl, no ronchi, rales or wheezing  HEART: RRR, no MRG  ABDOMEN: Soft, minimally ttp around drain site, nondistended, +bs x 4 quadrants, drain in place  EXTREMITIES:  2+ Peripheral Pulses, No clubbing, cyanosis, or edema, no calf tenderness  PSYCH: AAOx3  NEUROLOGY: non-focal  SKIN: No rashes or lesions      Assessment:  78 y/o F admitted for recurrent abdominal abscess, s/p IR drainage with 8 [place] citizen left in place, c/o "shivering and feeling cold", currently afebrile, with VSS,    Plan:  - ICU consulted,  - warming blankets  - serial abdominal exams  - trend vitals  - blood cultures ordered, f/u  - RN to call for any changes

## 2020-02-19 NOTE — H&P ADULT - NSHPREVIEWOFSYSTEMS_GEN_ALL_CORE
REVIEW OF SYSTEMS      General:	no chills    Skin/Breast: no recent rashes  	  Ophthalmologic: no changes in vision    Respiratory and Thorax: No SOB  	  Cardiovascular:	no palpitations    Gastrointestinal:	 see above     Genitourinary:  no changes.  Recent URI- +culture- enterococcus faecalis- treated with amoxicillin	    Musculoskeletal:	mild difficulty ambulating due to abdominal pain    Neurological:	 no headaches    Psychiatric:	no depression    Endocrine: h/o DM- on metformin

## 2020-02-19 NOTE — H&P ADULT - NSHPLABSRESULTS_GEN_ALL_CORE
11.6   14.42 )-----------( 399      ( 19 Feb 2020 11:50 )             35.4   \02-19    137  |  101  |  10  ----------------------------<  137<H>  3.5   |  26  |  0.84    Ca    9.9      19 Feb 2020 11:50    TPro  7.9  /  Alb  3.1<L>  /  TBili  0.5  /  DBili  x   /  AST  15  /  ALT  17  /  AlkPhos  86  02-19    Lactate, Blood (02.19.20 @ 11:50)    Lactate, Blood: 2.2: Elevated lactate. Consider ordering follow-up lactate to trend. mmol/L    Activated Partial Thromboplastin Time in AM (02.19.20 @ 11:50)    Activated Partial Thromboplastin Time: 29.5 sec    Prothrombin Time and INR, Plasma in AM (02.19.20 @ 11:50)    Prothrombin Time, Plasma: 15.5 sec    INR: 1.37: Recommended ranges for therapeutic INR:    2.0-3.0 for most medical and surgical thromboembolic states    2.0-3.0 for atrial fibrillation    2.0-3.0 for bileaflet mechanical valve in aortic position    2.5-3.5 for mechanical heart valves    Chest 2004;126:[id number]  The presence of direct thrombin inhibitors (argatroban, refludan)  may falsely increase results. ratio

## 2020-02-19 NOTE — ED PROVIDER NOTE - OBJECTIVE STATEMENT
80 yo F PMHx HTN, HLD, DM presents to ED c/o right sided abdominal pain x ~ 2 months. Was diagnosed with "abdominal abscess"- had drained placed and was on antibiotics until last week. Pt states that since stopping antibiotics, pain has increased, with associated N/V and fever/chills. Tmax 102F-- Pt has been under Dr. Lior Fields's care but now care to be transferred to Dr. Singh. Pt here for admission to his service. Pt not on any blood thinners. Last meal 4 hours ago.  PCP- Kamila

## 2020-02-19 NOTE — CHART NOTE - NSCHARTNOTEFT_GEN_A_CORE
Patient with multiple RLQ thick walled abscesses on CT scan done in an out side facility, ( Images CD provided by the Dr. Singh ) Case discussed with the Surgeon. Image guided drainage, possible  3 drainage catheters placement under CT scan guidance , considered as first step to resolve these collections, keeping surgical option for future. An informed consent will be obtained . Risks and benefits will be discussed prior to procedure.

## 2020-02-19 NOTE — CONSULT NOTE ADULT - ASSESSMENT
IMP: multiple intraabd absc. poss perf AP    Plan:given zosyn in ED x 1. will cont zosyn. for drainage absc's per IR
Patient is a 79 year old female with a pmhx of HTN, HLD, recent abscess requiring abx and intra-abdominal drain who presents to [facility] for right sided abdominal pain, found to have    1. Lactic acidosis  2. Intra abdominal collection      Plan:  - Pt underwent successful drainage of abdominal collection  - now with 2 drains, 1 subcutaneous and 1 intraabdominal   - Post op c/o rigors, on my exam /70, HR 81 and temp 99.5 oral  - concern is for septic post instrumentation, however vital signs stable at this time  - blood cxs sent  - Patient none toxic looking, explains that she feels better now that blankets have been put on her  - Cont zosyn for broad spectrum coverage   - cont dextrose + NS for fluids   - Tylenol and Toradol IV for pain   - NPO expect meds   - Patient is HD stable, patient notes improvement in shivering and does not require ICU level monitoring at this time  - If patient becomes unstable or hypotensive please reconsult as needed  - Case d/w Dr. Hawkins and Dr. Singh

## 2020-02-19 NOTE — ED ADULT TRIAGE NOTE - CHIEF COMPLAINT QUOTE
Patient was being treated for abdominal abscess under Dr. Hartley and was referred to Dr. Singh as she is still running a fever since last week with right abdominal pain and was advised that she will be admitted under Dr. Singh.

## 2020-02-19 NOTE — CONSULT NOTE ADULT - ATTENDING COMMENTS
78 yo F with Hx intra-abdominal abscesses s/p prolonged course of Abx and drainage, possible gallbladder disease, DM2, HLD, she now presents with fever and recurrence of RLQ abscesses seen on outpt CT.  Today she had and drains placed by IR.  In the recovery area she had an episode of fever and rigors which has since resolved.  She currently appears well, is not in pain with normal vital signs (HR 80s, /77).  No need for ICU admission at this time.    Continue zosyn and f/u Cx data  Continue workup for etiology of abscesses  Call if condition changes.

## 2020-02-19 NOTE — H&P ADULT - ASSESSMENT
78 yo female here for abdominal pain with abdominal abscess, pmhx of DM, HTN.    Plan:  - Admit to Dr Singh  -IVF  -NPO except medications (after IR drainage)  -To IR for drainage of abscess  -Pain medications  -IV antibiotics per ID  -ID consult, medicine consult   -Labs in AM  -Accuchecks  -Home medications, insulin sliding scale.  -Discussed with Dr. Singh.

## 2020-02-20 LAB
ALBUMIN SERPL ELPH-MCNC: 2.7 G/DL — LOW (ref 3.3–5)
ALP SERPL-CCNC: 88 U/L — SIGNIFICANT CHANGE UP (ref 40–120)
ALT FLD-CCNC: 14 U/L — SIGNIFICANT CHANGE UP (ref 12–78)
ANION GAP SERPL CALC-SCNC: 11 MMOL/L — SIGNIFICANT CHANGE UP (ref 5–17)
AST SERPL-CCNC: 13 U/L — LOW (ref 15–37)
BILIRUB SERPL-MCNC: 0.5 MG/DL — SIGNIFICANT CHANGE UP (ref 0.2–1.2)
BUN SERPL-MCNC: 7 MG/DL — SIGNIFICANT CHANGE UP (ref 7–23)
CALCIUM SERPL-MCNC: 9 MG/DL — SIGNIFICANT CHANGE UP (ref 8.5–10.1)
CHLORIDE SERPL-SCNC: 105 MMOL/L — SIGNIFICANT CHANGE UP (ref 96–108)
CO2 SERPL-SCNC: 25 MMOL/L — SIGNIFICANT CHANGE UP (ref 22–31)
CREAT SERPL-MCNC: 0.82 MG/DL — SIGNIFICANT CHANGE UP (ref 0.5–1.3)
GLUCOSE SERPL-MCNC: 168 MG/DL — HIGH (ref 70–99)
HCT VFR BLD CALC: 34.6 % — SIGNIFICANT CHANGE UP (ref 34.5–45)
HGB BLD-MCNC: 11.3 G/DL — LOW (ref 11.5–15.5)
MCHC RBC-ENTMCNC: 29.7 PG — SIGNIFICANT CHANGE UP (ref 27–34)
MCHC RBC-ENTMCNC: 32.7 GM/DL — SIGNIFICANT CHANGE UP (ref 32–36)
MCV RBC AUTO: 91.1 FL — SIGNIFICANT CHANGE UP (ref 80–100)
NRBC # BLD: 0 /100 WBCS — SIGNIFICANT CHANGE UP (ref 0–0)
PLATELET # BLD AUTO: 417 K/UL — HIGH (ref 150–400)
POTASSIUM SERPL-MCNC: 3.3 MMOL/L — LOW (ref 3.5–5.3)
POTASSIUM SERPL-SCNC: 3.3 MMOL/L — LOW (ref 3.5–5.3)
PROT SERPL-MCNC: 7.1 G/DL — SIGNIFICANT CHANGE UP (ref 6–8.3)
RBC # BLD: 3.8 M/UL — SIGNIFICANT CHANGE UP (ref 3.8–5.2)
RBC # FLD: 14 % — SIGNIFICANT CHANGE UP (ref 10.3–14.5)
SODIUM SERPL-SCNC: 141 MMOL/L — SIGNIFICANT CHANGE UP (ref 135–145)
WBC # BLD: 12.55 K/UL — HIGH (ref 3.8–10.5)
WBC # FLD AUTO: 12.55 K/UL — HIGH (ref 3.8–10.5)

## 2020-02-20 PROCEDURE — 99233 SBSQ HOSP IP/OBS HIGH 50: CPT

## 2020-02-20 RX ORDER — ACETAMINOPHEN 500 MG
1000 TABLET ORAL ONCE
Refills: 0 | Status: COMPLETED | OUTPATIENT
Start: 2020-02-20 | End: 2020-02-20

## 2020-02-20 RX ORDER — DEXTROSE 50 % IN WATER 50 %
25 SYRINGE (ML) INTRAVENOUS ONCE
Refills: 0 | Status: DISCONTINUED | OUTPATIENT
Start: 2020-02-20 | End: 2020-02-26

## 2020-02-20 RX ORDER — GLUCAGON INJECTION, SOLUTION 0.5 MG/.1ML
1 INJECTION, SOLUTION SUBCUTANEOUS ONCE
Refills: 0 | Status: DISCONTINUED | OUTPATIENT
Start: 2020-02-20 | End: 2020-02-20

## 2020-02-20 RX ORDER — DEXTROSE 50 % IN WATER 50 %
12.5 SYRINGE (ML) INTRAVENOUS ONCE
Refills: 0 | Status: DISCONTINUED | OUTPATIENT
Start: 2020-02-20 | End: 2020-02-20

## 2020-02-20 RX ORDER — INSULIN LISPRO 100/ML
VIAL (ML) SUBCUTANEOUS
Refills: 0 | Status: DISCONTINUED | OUTPATIENT
Start: 2020-02-20 | End: 2020-02-20

## 2020-02-20 RX ORDER — GLUCAGON INJECTION, SOLUTION 0.5 MG/.1ML
1 INJECTION, SOLUTION SUBCUTANEOUS ONCE
Refills: 0 | Status: DISCONTINUED | OUTPATIENT
Start: 2020-02-20 | End: 2020-02-26

## 2020-02-20 RX ORDER — DEXTROSE 50 % IN WATER 50 %
25 SYRINGE (ML) INTRAVENOUS ONCE
Refills: 0 | Status: DISCONTINUED | OUTPATIENT
Start: 2020-02-20 | End: 2020-02-20

## 2020-02-20 RX ORDER — SODIUM CHLORIDE 9 MG/ML
1000 INJECTION, SOLUTION INTRAVENOUS
Refills: 0 | Status: DISCONTINUED | OUTPATIENT
Start: 2020-02-20 | End: 2020-02-26

## 2020-02-20 RX ORDER — DEXTROSE 50 % IN WATER 50 %
15 SYRINGE (ML) INTRAVENOUS ONCE
Refills: 0 | Status: DISCONTINUED | OUTPATIENT
Start: 2020-02-20 | End: 2020-02-26

## 2020-02-20 RX ORDER — DEXTROSE 50 % IN WATER 50 %
15 SYRINGE (ML) INTRAVENOUS ONCE
Refills: 0 | Status: DISCONTINUED | OUTPATIENT
Start: 2020-02-20 | End: 2020-02-20

## 2020-02-20 RX ORDER — DEXTROSE 50 % IN WATER 50 %
12.5 SYRINGE (ML) INTRAVENOUS ONCE
Refills: 0 | Status: DISCONTINUED | OUTPATIENT
Start: 2020-02-20 | End: 2020-02-26

## 2020-02-20 RX ORDER — POTASSIUM CHLORIDE 20 MEQ
40 PACKET (EA) ORAL ONCE
Refills: 0 | Status: COMPLETED | OUTPATIENT
Start: 2020-02-20 | End: 2020-02-20

## 2020-02-20 RX ORDER — INSULIN LISPRO 100/ML
VIAL (ML) SUBCUTANEOUS EVERY 6 HOURS
Refills: 0 | Status: DISCONTINUED | OUTPATIENT
Start: 2020-02-20 | End: 2020-02-26

## 2020-02-20 RX ORDER — SODIUM CHLORIDE 9 MG/ML
1000 INJECTION, SOLUTION INTRAVENOUS
Refills: 0 | Status: DISCONTINUED | OUTPATIENT
Start: 2020-02-20 | End: 2020-02-20

## 2020-02-20 RX ADMIN — Medication 15 MILLIGRAM(S): at 21:50

## 2020-02-20 RX ADMIN — Medication 1000 MILLIGRAM(S): at 09:00

## 2020-02-20 RX ADMIN — Medication 15 MILLIGRAM(S): at 22:05

## 2020-02-20 RX ADMIN — PIPERACILLIN AND TAZOBACTAM 25 GRAM(S): 4; .5 INJECTION, POWDER, LYOPHILIZED, FOR SOLUTION INTRAVENOUS at 21:45

## 2020-02-20 RX ADMIN — Medication 2: at 11:23

## 2020-02-20 RX ADMIN — LOSARTAN POTASSIUM 100 MILLIGRAM(S): 100 TABLET, FILM COATED ORAL at 05:53

## 2020-02-20 RX ADMIN — Medication 1: at 16:47

## 2020-02-20 RX ADMIN — Medication 1: at 06:25

## 2020-02-20 RX ADMIN — AMLODIPINE BESYLATE 10 MILLIGRAM(S): 2.5 TABLET ORAL at 05:54

## 2020-02-20 RX ADMIN — DEXTROSE MONOHYDRATE, SODIUM CHLORIDE, AND POTASSIUM CHLORIDE 100 MILLILITER(S): 50; .745; 4.5 INJECTION, SOLUTION INTRAVENOUS at 19:01

## 2020-02-20 RX ADMIN — PIPERACILLIN AND TAZOBACTAM 25 GRAM(S): 4; .5 INJECTION, POWDER, LYOPHILIZED, FOR SOLUTION INTRAVENOUS at 14:33

## 2020-02-20 RX ADMIN — Medication 200 MILLIGRAM(S): at 05:53

## 2020-02-20 RX ADMIN — Medication 40 MILLIEQUIVALENT(S): at 19:02

## 2020-02-20 RX ADMIN — Medication 400 MILLIGRAM(S): at 08:41

## 2020-02-20 RX ADMIN — Medication 400 MILLIGRAM(S): at 15:54

## 2020-02-20 RX ADMIN — Medication 1000 MILLIGRAM(S): at 16:16

## 2020-02-20 RX ADMIN — Medication 400 MILLIGRAM(S): at 00:18

## 2020-02-20 RX ADMIN — Medication 100 MILLIGRAM(S): at 21:45

## 2020-02-20 RX ADMIN — DEXTROSE MONOHYDRATE, SODIUM CHLORIDE, AND POTASSIUM CHLORIDE 100 MILLILITER(S): 50; .745; 4.5 INJECTION, SOLUTION INTRAVENOUS at 09:22

## 2020-02-20 RX ADMIN — PIPERACILLIN AND TAZOBACTAM 25 GRAM(S): 4; .5 INJECTION, POWDER, LYOPHILIZED, FOR SOLUTION INTRAVENOUS at 05:54

## 2020-02-20 RX ADMIN — Medication 1000 MILLIGRAM(S): at 00:45

## 2020-02-20 NOTE — PROGRESS NOTE ADULT - SUBJECTIVE AND OBJECTIVE BOX
SUBJECTIVE:  Patient seen and examined at bedside.  No overnight events.  Patient with no new complaints at this time, feels well, wants to eat.  Admits to flatus, no BM.  Patient denies any fevers, chills, chest pain, shortness of breath, nausea or vomiting.    Vital Signs Last 24 Hrs  T(C): 36.6 (20 Feb 2020 07:34), Max: 37.6 (19 Feb 2020 21:42)  T(F): 97.9 (20 Feb 2020 07:34), Max: 99.7 (19 Feb 2020 21:42)  HR: 68 (20 Feb 2020 07:34) (68 - 93)  BP: 130/70 (20 Feb 2020 07:34) (113/62 - 154/80)  RR: 17 (20 Feb 2020 07:34) (14 - 18)  SpO2: 97% (20 Feb 2020 07:34) (94% - 99%)    PHYSICAL EXAM  GENERAL:  Well-nourished, well-developed female lying comfortably in bed in NAD  HEAD:  Normocephalic, atraumatic  CARDIO:  Regular rate and rhythm.  No murmur, gallop or rub appreciated.  RESPIRATORY:  Clear to auscultation bilaterally.  No wheezing, rales or rhonchi appreciated.  ABDOMEN:  Soft, nondistended, +mild TTP around drain sites;  Intra-abdominal drain in place with approximately 10cc opaque pink fluid. Subcutaneous drain in place with approximately 10cc serosanguinous fluid.  Dressings clean and dry.  BS appreciated on auscultation.  No rebound tenderness or guarding.  EXTREMITIES: No calf tenderness  NEURO:  A&O x 3    I&O's Summary    19 Feb 2020 07:01  -  20 Feb 2020 07:00  --------------------------------------------------------  IN: 960 mL / OUT: 1665 mL / NET: -705 mL    I&O's Detail    19 Feb 2020 07:01  -  20 Feb 2020 07:00  --------------------------------------------------------  IN:    dextrose 5% + sodium chloride 0.45% with potassium chloride 20 mEq/L: 900 mL    Other: 20 mL    Other: 40 mL  Total IN: 960 mL    OUT:    Other: 20 mL    Other: 45 mL    Voided: 1600 mL  Total OUT: 1665 mL    Total NET: -705 mL    MEDICATIONS  (STANDING):  acetaminophen  IVPB .. 1000 milliGRAM(s) IV Intermittent once  amLODIPine   Tablet 10 milliGRAM(s) Oral daily  dextrose 5% + sodium chloride 0.45% with potassium chloride 20 mEq/L 1000 milliLiter(s) (100 mL/Hr) IV Continuous <Continuous>  dextrose 5%. 1000 milliLiter(s) (50 mL/Hr) IV Continuous <Continuous>  dextrose 50% Injectable 12.5 Gram(s) IV Push once  dextrose 50% Injectable 25 Gram(s) IV Push once  dextrose 50% Injectable 25 Gram(s) IV Push once  insulin lispro (HumaLOG) corrective regimen sliding scale   SubCutaneous every 6 hours  losartan 100 milliGRAM(s) Oral daily  metoprolol succinate  milliGRAM(s) Oral at bedtime  metoprolol succinate  milliGRAM(s) Oral daily  piperacillin/tazobactam IVPB.. 3.375 Gram(s) IV Intermittent every 8 hours  potassium chloride    Tablet ER 40 milliEquivalent(s) Oral once    MEDICATIONS  (PRN):  dextrose 40% Gel 15 Gram(s) Oral once PRN Blood Glucose LESS THAN 70 milliGRAM(s)/deciliter  glucagon  Injectable 1 milliGRAM(s) IntraMuscular once PRN Glucose LESS THAN 70 milligrams/deciliter  ketorolac   Injectable 15 milliGRAM(s) IV Push every 8 hours PRN Moderate Pain (4 - 6)  ketorolac   Injectable 30 milliGRAM(s) IV Push every 8 hours PRN Severe Pain (7 - 10)    LABS:                        11.3   12.55 )-----------( 417      ( 20 Feb 2020 06:44 )             34.6     02-20    141  |  105  |  7   ----------------------------<  168<H>  3.3<L>   |  25  |  0.82    Ca    9.0      20 Feb 2020 06:44    TPro  7.1  /  Alb  2.7<L>  /  TBili  0.5  /  DBili  x   /  AST  13<L>  /  ALT  14  /  AlkPhos  88  02-20    PT/INR - ( 19 Feb 2020 11:50 )   PT: 15.5 sec;   INR: 1.37 ratio  PTT - ( 19 Feb 2020 11:50 )  PTT:29.5 sec    ASSESSMENT & PLAN  79 year old female with abdominal abscess, s/p IR drainage with subcutaneous and intra-abdominal drain placement.    - Advance to clear liquid diet  - Continue zosyn, follow up cultures per ID  - Drain monitoring  - OOB, pain control, SCDs  - Follow up AM labs. K repleted today.  - Case discussed with Dr. Singh

## 2020-02-20 NOTE — PROGRESS NOTE ADULT - SUBJECTIVE AND OBJECTIVE BOX
Patient is a 79y old  Female who presents with a chief complaint of Abdominal abscess (19 Feb 2020 18:56)    Asked to see patient for ID Consult  Interval History:Intraabd absc's. Poss ruptured AP    CENTRAL LINE:   [  ] YES       [x  ] NO  GAXIOLA:                 [  ] YES       [ x ] NO     PAST MEDICAL & SURGICAL HISTORY:  Hyperlipidemia  Diabetes  Hypertension  H/O tubal ligation    nonsmoker    REVIEW OF SYSTEMS:  All systems below were reviewed and are normal [  ]  Constitutional:  HEENT:  Lymph nodes:  ID:  Pulmonary:no cough sob  Cardiac:no CP  GI:no NVD abd pain  Renal:  Musculoskeletal:  Neuro:  Endocrine:  Skin:  All other systems above were reviewed and are normal   [ x ]    Allergies  Allergies    Demerol (Anaphylaxis)    Intolerances        MEDICATIONS  (STANDING):  acetaminophen  IVPB .. 1000 milliGRAM(s) IV Intermittent once  acetaminophen  IVPB .. 1000 milliGRAM(s) IV Intermittent once  amLODIPine   Tablet 10 milliGRAM(s) Oral daily  dextrose 5% + sodium chloride 0.45% with potassium chloride 20 mEq/L 1000 milliLiter(s) (100 mL/Hr) IV Continuous <Continuous>  dextrose 5%. 1000 milliLiter(s) (50 mL/Hr) IV Continuous <Continuous>  dextrose 50% Injectable 12.5 Gram(s) IV Push once  dextrose 50% Injectable 25 Gram(s) IV Push once  dextrose 50% Injectable 25 Gram(s) IV Push once  insulin lispro (HumaLOG) corrective regimen sliding scale   SubCutaneous every 6 hours  losartan 100 milliGRAM(s) Oral daily  metoprolol succinate  milliGRAM(s) Oral at bedtime  metoprolol succinate  milliGRAM(s) Oral daily  piperacillin/tazobactam IVPB.. 3.375 Gram(s) IV Intermittent every 8 hours  potassium chloride    Tablet ER 40 milliEquivalent(s) Oral once    MEDICATIONS  (PRN):  dextrose 40% Gel 15 Gram(s) Oral once PRN Blood Glucose LESS THAN 70 milliGRAM(s)/deciliter  glucagon  Injectable 1 milliGRAM(s) IntraMuscular once PRN Glucose LESS THAN 70 milligrams/deciliter  ketorolac   Injectable 15 milliGRAM(s) IV Push every 8 hours PRN Moderate Pain (4 - 6)  ketorolac   Injectable 30 milliGRAM(s) IV Push every 8 hours PRN Severe Pain (7 - 10)      Vital Signs Last 24 Hrs  T(C): 36.6 (20 Feb 2020 07:34), Max: 37.6 (19 Feb 2020 21:42)  T(F): 97.9 (20 Feb 2020 07:34), Max: 99.7 (19 Feb 2020 21:42)  HR: 68 (20 Feb 2020 07:34) (68 - 93)  BP: 130/70 (20 Feb 2020 07:34) (113/62 - 154/80)  BP(mean): --  RR: 17 (20 Feb 2020 07:34) (14 - 18)  SpO2: 97% (20 Feb 2020 07:34) (94% - 99%)    I&O's Summary    19 Feb 2020 07:01  -  20 Feb 2020 07:00  --------------------------------------------------------  IN: 960 mL / OUT: 1665 mL / NET: -705 mL        PHYSICAL EXAM:  Constitutional:  HEENT:  Lymph nodes:  Neck:  Lungs:clear  Heart:rr  Abdomen:soft nontender. multiiple drains RLQ-draining purulent material  Renal:  Extremities:  Musculoskeletal:  Neurologic:  Vascular:  Endocrine:  Skin:  All of the above normal except for written comments [x  ]    LABORATORY:    CBC Full  -  ( 20 Feb 2020 06:44 )  WBC Count : 12.55 K/uL  RBC Count : 3.80 M/uL  Hemoglobin : 11.3 g/dL  Hematocrit : 34.6 %  Platelet Count - Automated : 417 K/uL  Mean Cell Volume : 91.1 fl  Mean Cell Hemoglobin : 29.7 pg  Mean Cell Hemoglobin Concentration : 32.7 gm/dL  Auto Neutrophil # : x  Auto Lymphocyte # : x  Auto Monocyte # : x  Auto Eosinophil # : x  Auto Basophil # : x  Auto Neutrophil % : x  Auto Lymphocyte % : x  Auto Monocyte % : x  Auto Eosinophil % : x  Auto Basophil % : x          02-20    141  |  105  |  7   ----------------------------<  168<H>  3.3<L>   |  25  |  0.82    Ca    9.0      20 Feb 2020 06:44    TPro  7.1  /  Alb  2.7<L>  /  TBili  0.5  /  DBili  x   /  AST  13<L>  /  ALT  14  /  AlkPhos  88  02-20              Vanco. trough:  Genta. trough:      Radiology:< from: CT Aspiration Abscess Hematoma, Cyst (02.19.20 @ 17:26) >  EXAM:  CT ASP ABSC HEMATOMA CYST#                            PROCEDURE DATE:  02/19/2020          INTERPRETATION:    CT scan guided right flank and right intra-abdominal abscess aspiration  and drainage catheter placement :  CT abscessogram:  Clinical History:    Abdominal abscess. Patient had a drainage catheter placed in outside facility and discontinued the catheter of the follow-up CT scan and resolution of abscess.    However, patient had fever, follow-up CT showed multiple right flank collections with thick wall suggestive of abscesses in the right flank subcutaneous plane, right intra-abdominal retrocecal collections.    Image guided procedure requested by Dr. Singh .    Technique:    An informed consent obtained from the patient inthe intervention radiology suite in presence of intervention radiology nurse  . Patient placed supine on the CT scan table. Limited noncontrast CT abdomen skin markers showed collections. The skin sites marked. Time out procedure was performed. The marked site prepped and draped in sterile fashion. Under noncontrast limited CT abdomen the subcutaneous abscess was accessed after anesthetizing the tract with 1% lidocaine.  The micropuncture needle was exchanged over a microwire to a microcatheter. 5 cc of thick pus was aspirated. Fluid sent to the lab for further analysis  subsequently, microcatheter exchanged over a Bentson wire to a 8 German fascial dilator, to a 8 German multipurpose pigtail drainage catheter lies in the subcutaneous collection. Approximately 15 cc of thick pus aspirated.. The catheter anchored to the skin using StatLock device. The abscess cavity was irrigated with normal saline till the returns were clear. . Hemostasis secured by manual compression.   Following similarsteps, Limited noncontrast CT abdomen skin markers showed collections. The skin sites marked. Under noncontrast limited CT abdomen the intra-abdominal collection was accessed after anesthetizing the tract with 1% lidocaine.  The micropuncture needle was exchanged over a microwire to a microcatheter. 5 cc of thick pus was aspirated. Fluid sent to the lab for further analysis  subsequently, microcatheter exchanged over a Bentson wire to a 8 German fascial dilator, to a 8 German multipurpose pigtaildrainage catheter. Approximately 15 cc of thick pus aspirated.. The catheter anchored to the skin using StatLock device. The abscess cavity was irrigated with normal saline till the returns were clear. . Hemostasis secured by manual compression.  At this time, approximately 45 cc of nonionic contrast Omnipaque injected through the intra-abdominal abscess drainage catheter. Follow-up CT scan was done to assess the communication of abscess.    Patient tolerated the procedure well with 1% lidocaineand sedation provided by anesthesiologist . No complications noted during the procedure. Immediately after the procedure. Patient transferred to the recovery unit in stable condition for observation and management Patient's vital signs were monitoredthroughout the procedure.  Patient had chills and right cusp postprocedure in the recovery unit.. Vital signs were normal. Temperature 98.2, respiratory rate 20, oxygen saturation 97%. Blood pressure 157 x 65 mmHg. Dr. Singh informed at 5:50 PM.  Findings:    Right flank subcutaneous abscess with thick pus. Fluid sent for lab analysis. Approximately 15 cc of thick pus aspirated.    The right lower quadrant intra-abdominal abscesses show communication on contrast abscessogram. Approximately 15 ccof thick pus aspirated from this abscess as well. Abscess collection sent for lab analysis.    Impression:    Successful CT-guided right flank subcutaneous and intra-abdominal abscess drainage catheters placement. Pus sent for lab analysis into separate containers.    45 cc nonionic contrast utilized for the procedure.    Total exam .45 mGGycm2 .                REHANA SALINAS M.D., ATTENDING RADIOLOGIST  This document has been electronically signed. Feb 19 2020  6:20PM          < end of copied text >      Microbiology:  bl and absc cx's pending

## 2020-02-21 LAB
ANION GAP SERPL CALC-SCNC: 9 MMOL/L — SIGNIFICANT CHANGE UP (ref 5–17)
BUN SERPL-MCNC: 3 MG/DL — LOW (ref 7–23)
CALCIUM SERPL-MCNC: 8.7 MG/DL — SIGNIFICANT CHANGE UP (ref 8.5–10.1)
CHLORIDE SERPL-SCNC: 109 MMOL/L — HIGH (ref 96–108)
CO2 SERPL-SCNC: 23 MMOL/L — SIGNIFICANT CHANGE UP (ref 22–31)
CREAT SERPL-MCNC: 0.61 MG/DL — SIGNIFICANT CHANGE UP (ref 0.5–1.3)
GLUCOSE SERPL-MCNC: 154 MG/DL — HIGH (ref 70–99)
HCT VFR BLD CALC: 31.2 % — LOW (ref 34.5–45)
HGB BLD-MCNC: 10.1 G/DL — LOW (ref 11.5–15.5)
MCHC RBC-ENTMCNC: 29.4 PG — SIGNIFICANT CHANGE UP (ref 27–34)
MCHC RBC-ENTMCNC: 32.4 GM/DL — SIGNIFICANT CHANGE UP (ref 32–36)
MCV RBC AUTO: 91 FL — SIGNIFICANT CHANGE UP (ref 80–100)
NRBC # BLD: 0 /100 WBCS — SIGNIFICANT CHANGE UP (ref 0–0)
PLATELET # BLD AUTO: 381 K/UL — SIGNIFICANT CHANGE UP (ref 150–400)
POTASSIUM SERPL-MCNC: 4 MMOL/L — SIGNIFICANT CHANGE UP (ref 3.5–5.3)
POTASSIUM SERPL-SCNC: 4 MMOL/L — SIGNIFICANT CHANGE UP (ref 3.5–5.3)
RBC # BLD: 3.43 M/UL — LOW (ref 3.8–5.2)
RBC # FLD: 13.9 % — SIGNIFICANT CHANGE UP (ref 10.3–14.5)
SODIUM SERPL-SCNC: 141 MMOL/L — SIGNIFICANT CHANGE UP (ref 135–145)
WBC # BLD: 8.07 K/UL — SIGNIFICANT CHANGE UP (ref 3.8–10.5)
WBC # FLD AUTO: 8.07 K/UL — SIGNIFICANT CHANGE UP (ref 3.8–10.5)

## 2020-02-21 PROCEDURE — 99233 SBSQ HOSP IP/OBS HIGH 50: CPT

## 2020-02-21 RX ADMIN — DEXTROSE MONOHYDRATE, SODIUM CHLORIDE, AND POTASSIUM CHLORIDE 100 MILLILITER(S): 50; .745; 4.5 INJECTION, SOLUTION INTRAVENOUS at 14:50

## 2020-02-21 RX ADMIN — LOSARTAN POTASSIUM 100 MILLIGRAM(S): 100 TABLET, FILM COATED ORAL at 05:11

## 2020-02-21 RX ADMIN — AMLODIPINE BESYLATE 10 MILLIGRAM(S): 2.5 TABLET ORAL at 05:11

## 2020-02-21 RX ADMIN — PIPERACILLIN AND TAZOBACTAM 25 GRAM(S): 4; .5 INJECTION, POWDER, LYOPHILIZED, FOR SOLUTION INTRAVENOUS at 05:11

## 2020-02-21 RX ADMIN — Medication 15 MILLIGRAM(S): at 10:07

## 2020-02-21 RX ADMIN — Medication 200 MILLIGRAM(S): at 05:11

## 2020-02-21 RX ADMIN — Medication 30 MILLIGRAM(S): at 21:16

## 2020-02-21 RX ADMIN — Medication 15 MILLIGRAM(S): at 10:37

## 2020-02-21 RX ADMIN — Medication 1: at 08:33

## 2020-02-21 RX ADMIN — DEXTROSE MONOHYDRATE, SODIUM CHLORIDE, AND POTASSIUM CHLORIDE 100 MILLILITER(S): 50; .745; 4.5 INJECTION, SOLUTION INTRAVENOUS at 05:11

## 2020-02-21 RX ADMIN — PIPERACILLIN AND TAZOBACTAM 25 GRAM(S): 4; .5 INJECTION, POWDER, LYOPHILIZED, FOR SOLUTION INTRAVENOUS at 14:50

## 2020-02-21 RX ADMIN — PIPERACILLIN AND TAZOBACTAM 25 GRAM(S): 4; .5 INJECTION, POWDER, LYOPHILIZED, FOR SOLUTION INTRAVENOUS at 21:15

## 2020-02-21 RX ADMIN — Medication 30 MILLIGRAM(S): at 21:31

## 2020-02-21 RX ADMIN — Medication 100 MILLIGRAM(S): at 21:15

## 2020-02-21 NOTE — DIETITIAN INITIAL EVALUATION ADULT. - PHYSICAL APPEARANCE
other (specify)/BMI 26.6 cynthia Carrillo no pressure injury appears with no muscle / fat wasting. will follow PO intake now diet advanced with patient stating now feels like she wants to eat and is feeling better

## 2020-02-21 NOTE — DIETITIAN INITIAL EVALUATION ADULT. - ADD RECOMMEND
1. recommend consistent cho full liquid diet. 2. advance to consistent cho dash tlc soft as appropriate. 3. follow PO for addition of glucerna supplement especially in light of weight loss.

## 2020-02-21 NOTE — PROGRESS NOTE ADULT - SUBJECTIVE AND OBJECTIVE BOX
Patient is a 79y old  Female who presents with a chief complaint of Abdominal abscess (19 Feb 2020 18:56)    Asked to see patient for ID Consult  Interval History:Intraabd absc's. Poss ruptured AP    CENTRAL LINE:   [  ] YES       [x  ] NO  GAXIOLA:                 [  ] YES       [ x ] NO     PAST MEDICAL & SURGICAL HISTORY:  Hyperlipidemia  Diabetes  Hypertension  H/O tubal ligation    nonsmoker    REVIEW OF SYSTEMS:  All systems below were reviewed and are normal [  ]  Constitutional:  HEENT:  Lymph nodes:  ID:  Pulmonary:no cough sob  Cardiac:no CP  GI:no NV abd pain. + softening of stool  Renal:  Musculoskeletal:  Neuro:  Endocrine:  Skin:  All other systems above were reviewed and are normal   [ x ]    Allergies  Allergies    Demerol (Anaphylaxis)    Intolerances          PHYSICAL EXAM:  Constitutional:  HEENT:  Lymph nodes:  Neck:  Lungs:clear  Heart:rr  Abdomen:soft nontender. multiiple drains RLQ-draining purulent material  Renal:  Extremities:  Musculoskeletal:  Neurologic:  Vascular:  Endocrine:  Skin:  All of the above normal except for written comments [x  ]    LABORATORY:  CBC Full  -  ( 21 Feb 2020 07:08 )  WBC Count : 8.07 K/uL  RBC Count : 3.43 M/uL  Hemoglobin : 10.1 g/dL  Hematocrit : 31.2 %  Platelet Count - Automated : 381 K/uL  Mean Cell Volume : 91.0 fl  Mean Cell Hemoglobin : 29.4 pg  Mean Cell Hemoglobin Concentration : 32.4 gm/dL  Auto Neutrophil # : x  Auto Lymphocyte # : x  Auto Monocyte # : x  Auto Eosinophil # : x  Auto Basophil # : x  Auto Neutrophil % : x  Auto Lymphocyte % : x  Auto Monocyte % : x  Auto Eosinophil % : x  Auto Basophil % : x    02-21    141  |  109<H>  |  3<L>  ----------------------------<  154<H>  4.0   |  23  |  0.61    Ca    8.7      21 Feb 2020 07:08    TPro  7.1  /  Alb  2.7<L>  /  TBili  0.5  /  DBili  x   /  AST  13<L>  /  ALT  14  /  AlkPhos  88  02-20        Vanco. trough:  Genta. trough:      Radiology:< from: CT Aspiration Abscess Hematoma, Cyst (02.19.20 @ 17:26) >  EXAM:  CT ASP ABSC HEMATOMA CYST#                            PROCEDURE DATE:  02/19/2020          INTERPRETATION:    CT scan guided right flank and right intra-abdominal abscess aspiration  and drainage catheter placement :  CT abscessogram:  Clinical History:    Abdominal abscess. Patient had a drainage catheter placed in outside facility and discontinued the catheter of the follow-up CT scan and resolution of abscess.    However, patient had fever, follow-up CT showed multiple right flank collections with thick wall suggestive of abscesses in the right flank subcutaneous plane, right intra-abdominal retrocecal collections.    Image guided procedure requested by Dr. Singh .    Technique:    An informed consent obtained from the patient inthe intervention radiology suite in presence of intervention radiology nurse  . Patient placed supine on the CT scan table. Limited noncontrast CT abdomen skin markers showed collections. The skin sites marked. Time out procedure was performed. The marked site prepped and draped in sterile fashion. Under noncontrast limited CT abdomen the subcutaneous abscess was accessed after anesthetizing the tract with 1% lidocaine.  The micropuncture needle was exchanged over a microwire to a microcatheter. 5 cc of thick pus was aspirated. Fluid sent to the lab for further analysis  subsequently, microcatheter exchanged over a Bentson wire to a 8 Kenyan fascial dilator, to a 8 Kenyan multipurpose pigtail drainage catheter lies in the subcutaneous collection. Approximately 15 cc of thick pus aspirated.. The catheter anchored to the skin using StatLock device. The abscess cavity was irrigated with normal saline till the returns were clear. . Hemostasis secured by manual compression.   Following similarsteps, Limited noncontrast CT abdomen skin markers showed collections. The skin sites marked. Under noncontrast limited CT abdomen the intra-abdominal collection was accessed after anesthetizing the tract with 1% lidocaine.  The micropuncture needle was exchanged over a microwire to a microcatheter. 5 cc of thick pus was aspirated. Fluid sent to the lab for further analysis  subsequently, microcatheter exchanged over a Bentson wire to a 8 Kenyan fascial dilator, to a 8 Kenyan multipurpose pigtaildrainage catheter. Approximately 15 cc of thick pus aspirated.. The catheter anchored to the skin using StatLock device. The abscess cavity was irrigated with normal saline till the returns were clear. . Hemostasis secured by manual compression.  At this time, approximately 45 cc of nonionic contrast Omnipaque injected through the intra-abdominal abscess drainage catheter. Follow-up CT scan was done to assess the communication of abscess.    Patient tolerated the procedure well with 1% lidocaineand sedation provided by anesthesiologist . No complications noted during the procedure. Immediately after the procedure. Patient transferred to the recovery unit in stable condition for observation and management Patient's vital signs were monitoredthroughout the procedure.  Patient had chills and right cusp postprocedure in the recovery unit.. Vital signs were normal. Temperature 98.2, respiratory rate 20, oxygen saturation 97%. Blood pressure 157 x 65 mmHg. Dr. Singh informed at 5:50 PM.  Findings:    Right flank subcutaneous abscess with thick pus. Fluid sent for lab analysis. Approximately 15 cc of thick pus aspirated.    The right lower quadrant intra-abdominal abscesses show communication on contrast abscessogram. Approximately 15 ccof thick pus aspirated from this abscess as well. Abscess collection sent for lab analysis.    Impression:    Successful CT-guided right flank subcutaneous and intra-abdominal abscess drainage catheters placement. Pus sent for lab analysis into separate containers.    45 cc nonionic contrast utilized for the procedure.    Total exam .45 mGGycm2 .                REHANA SALINAS M.D., ATTENDING RADIOLOGIST  This document has been electronically signed. Feb 19 2020  6:20PM          < end of copied text >      Microbiology:    Culture - Abscess with Gram Stain (collected 20 Feb 2020 01:07)  Source: .Abscess Abdominal Fluid  Preliminary Report (20 Feb 2020 18:19):    Moderate Escherichia coli    Culture - Abscess with Gram Stain (collected 20 Feb 2020 01:05)  Source: .Abscess Abdominal Fluid  Preliminary Report (20 Feb 2020 18:24):    Numerous Escherichia coli    Culture - Blood (collected 19 Feb 2020 16:29)  Source: .Blood Blood-Peripheral  Preliminary Report (20 Feb 2020 17:02):    No growth to date.    Culture - Blood (collected 19 Feb 2020 16:29)  Source: .Blood Blood-Peripheral  Preliminary Report (20 Feb 2020 17:02):    No growth to date.

## 2020-02-21 NOTE — PROGRESS NOTE ADULT - SUBJECTIVE AND OBJECTIVE BOX
SUBJECTIVE:  Patient seen and examined at bedside.  No overnight events.  Patient with no new complaints at this time, states she is feeling much better, decreased pain, tolerating clears, asking for more substantial diet.  Patient denies any fevers, chills, chest pain, shortness of breath, nausea or vomiting.    Vital Signs Last 24 Hrs  T(C): 36.7 (21 Feb 2020 07:15), Max: 36.7 (21 Feb 2020 07:15)  T(F): 98 (21 Feb 2020 07:15), Max: 98 (21 Feb 2020 07:15)  HR: 71 (21 Feb 2020 07:15) (71 - 85)  BP: 134/63 (21 Feb 2020 07:15) (109/65 - 144/74)  RR: 16 (21 Feb 2020 07:15) (16 - 17)  SpO2: 97% (21 Feb 2020 07:15) (97% - 97%)    PHYSICAL EXAM  GENERAL:  Well-nourished, well-developed female lying comfortably in bed in NAD  HEAD:  Normocephalic, atraumatic  ABDOMEN:  Soft, nondistended, +mild TTP around drain sites;  Intra-abdominal and subcutaneous drains with minimal output (<5cc). Dressings clean and dry.  No rebound tenderness or guarding.  NEURO:  A&O x 3    I&O's Summary    20 Feb 2020 07:01  -  21 Feb 2020 07:00  --------------------------------------------------------  IN: 1320 mL / OUT: 510 mL / NET: 810 mL    I&O's Detail    20 Feb 2020 07:01  -  21 Feb 2020 07:00  --------------------------------------------------------  IN:    dextrose 5% + sodium chloride 0.45% with potassium chloride 20 mEq/L: 1300 mL    Other: 20 mL  Total IN: 1320 mL    OUT:    Other: 10 mL    Voided: 500 mL  Total OUT: 510 mL    Total NET: 810 mL    MEDICATIONS  (STANDING):  amLODIPine   Tablet 10 milliGRAM(s) Oral daily  dextrose 5% + sodium chloride 0.45% with potassium chloride 20 mEq/L 1000 milliLiter(s) (100 mL/Hr) IV Continuous <Continuous>  dextrose 5%. 1000 milliLiter(s) (50 mL/Hr) IV Continuous <Continuous>  dextrose 50% Injectable 12.5 Gram(s) IV Push once  dextrose 50% Injectable 25 Gram(s) IV Push once  dextrose 50% Injectable 25 Gram(s) IV Push once  insulin lispro (HumaLOG) corrective regimen sliding scale   SubCutaneous every 6 hours  losartan 100 milliGRAM(s) Oral daily  metoprolol succinate  milliGRAM(s) Oral at bedtime  metoprolol succinate  milliGRAM(s) Oral daily  piperacillin/tazobactam IVPB.. 3.375 Gram(s) IV Intermittent every 8 hours    MEDICATIONS  (PRN):  dextrose 40% Gel 15 Gram(s) Oral once PRN Blood Glucose LESS THAN 70 milliGRAM(s)/deciliter  glucagon  Injectable 1 milliGRAM(s) IntraMuscular once PRN Glucose LESS THAN 70 milligrams/deciliter  ketorolac   Injectable 15 milliGRAM(s) IV Push every 8 hours PRN Moderate Pain (4 - 6)  ketorolac   Injectable 30 milliGRAM(s) IV Push every 8 hours PRN Severe Pain (7 - 10)    LABS:                        10.1   8.07  )-----------( 381      ( 21 Feb 2020 07:08 )             31.2     02-21    141  |  109<H>  |  3<L>  ----------------------------<  154<H>  4.0   |  23  |  0.61    Ca    8.7      21 Feb 2020 07:08    TPro  7.1  /  Alb  2.7<L>  /  TBili  0.5  /  DBili  x   /  AST  13<L>  /  ALT  14  /  AlkPhos  88  02-20    PT/INR - ( 19 Feb 2020 11:50 )   PT: 15.5 sec;   INR: 1.37 ratio    PTT - ( 19 Feb 2020 11:50 )  PTT:29.5 sec    Culture - Abscess with Gram Stain (collected 20 Feb 2020 01:07)  Source: .Abscess Abdominal Fluid  Preliminary Report (20 Feb 2020 18:19):    Moderate Escherichia coli    Culture - Abscess with Gram Stain (collected 20 Feb 2020 01:05)  Source: .Abscess Abdominal Fluid  Preliminary Report (20 Feb 2020 18:24):    Numerous Escherichia coli    Culture - Blood (collected 19 Feb 2020 16:29)  Source: .Blood Blood-Peripheral  Preliminary Report (20 Feb 2020 17:02):    No growth to date.    Culture - Blood (collected 19 Feb 2020 16:29)  Source: .Blood Blood-Peripheral  Preliminary Report (20 Feb 2020 17:02):    No growth to date.    ASSESSMENT & PLAN  79 year old female with abdominal abscess s/p IR drainage 2/19/20. Normalized WBC today.    - Advance to full liquid diet.  If tolerates, may have regular diet for dinner.t  - Continue zosyn, follow up cultures per ID  - Drain monitoring, regular flushes  - OOB, pain control, SCDs  - Case discussed with Dr. Singh SUBJECTIVE:  Patient seen and examined at bedside.  No overnight events.  Patient with no new complaints at this time, states she is feeling much better, decreased pain, tolerating clears, asking for more substantial diet.  Patient denies any fevers, chills, chest pain, shortness of breath, nausea or vomiting.    Vital Signs Last 24 Hrs  T(C): 36.7 (21 Feb 2020 07:15), Max: 36.7 (21 Feb 2020 07:15)  T(F): 98 (21 Feb 2020 07:15), Max: 98 (21 Feb 2020 07:15)  HR: 71 (21 Feb 2020 07:15) (71 - 85)  BP: 134/63 (21 Feb 2020 07:15) (109/65 - 144/74)  RR: 16 (21 Feb 2020 07:15) (16 - 17)  SpO2: 97% (21 Feb 2020 07:15) (97% - 97%)    PHYSICAL EXAM  GENERAL:  Well-nourished, well-developed female lying comfortably in bed in NAD  HEAD:  Normocephalic, atraumatic  ABDOMEN:  Soft, nondistended, +mild TTP around drain sites;  Intra-abdominal and subcutaneous drains with minimal output (<5cc). Dressings clean and dry.  No rebound tenderness or guarding.  NEURO:  A&O x 3    I&O's Summary    20 Feb 2020 07:01  -  21 Feb 2020 07:00  --------------------------------------------------------  IN: 1320 mL / OUT: 510 mL / NET: 810 mL    I&O's Detail    20 Feb 2020 07:01  -  21 Feb 2020 07:00  --------------------------------------------------------  IN:    dextrose 5% + sodium chloride 0.45% with potassium chloride 20 mEq/L: 1300 mL    Other: 20 mL  Total IN: 1320 mL    OUT:    Other: 10 mL    Voided: 500 mL  Total OUT: 510 mL    Total NET: 810 mL    MEDICATIONS  (STANDING):  amLODIPine   Tablet 10 milliGRAM(s) Oral daily  dextrose 5% + sodium chloride 0.45% with potassium chloride 20 mEq/L 1000 milliLiter(s) (100 mL/Hr) IV Continuous <Continuous>  dextrose 5%. 1000 milliLiter(s) (50 mL/Hr) IV Continuous <Continuous>  dextrose 50% Injectable 12.5 Gram(s) IV Push once  dextrose 50% Injectable 25 Gram(s) IV Push once  dextrose 50% Injectable 25 Gram(s) IV Push once  insulin lispro (HumaLOG) corrective regimen sliding scale   SubCutaneous every 6 hours  losartan 100 milliGRAM(s) Oral daily  metoprolol succinate  milliGRAM(s) Oral at bedtime  metoprolol succinate  milliGRAM(s) Oral daily  piperacillin/tazobactam IVPB.. 3.375 Gram(s) IV Intermittent every 8 hours    MEDICATIONS  (PRN):  dextrose 40% Gel 15 Gram(s) Oral once PRN Blood Glucose LESS THAN 70 milliGRAM(s)/deciliter  glucagon  Injectable 1 milliGRAM(s) IntraMuscular once PRN Glucose LESS THAN 70 milligrams/deciliter  ketorolac   Injectable 15 milliGRAM(s) IV Push every 8 hours PRN Moderate Pain (4 - 6)  ketorolac   Injectable 30 milliGRAM(s) IV Push every 8 hours PRN Severe Pain (7 - 10)    LABS:                        10.1   8.07  )-----------( 381      ( 21 Feb 2020 07:08 )             31.2     02-21    141  |  109<H>  |  3<L>  ----------------------------<  154<H>  4.0   |  23  |  0.61    Ca    8.7      21 Feb 2020 07:08    TPro  7.1  /  Alb  2.7<L>  /  TBili  0.5  /  DBili  x   /  AST  13<L>  /  ALT  14  /  AlkPhos  88  02-20    PT/INR - ( 19 Feb 2020 11:50 )   PT: 15.5 sec;   INR: 1.37 ratio    PTT - ( 19 Feb 2020 11:50 )  PTT:29.5 sec    Culture - Abscess with Gram Stain (collected 20 Feb 2020 01:07)  Source: .Abscess Abdominal Fluid  Preliminary Report (20 Feb 2020 18:19):    Moderate Escherichia coli    Culture - Abscess with Gram Stain (collected 20 Feb 2020 01:05)  Source: .Abscess Abdominal Fluid  Preliminary Report (20 Feb 2020 18:24):    Numerous Escherichia coli    Culture - Blood (collected 19 Feb 2020 16:29)  Source: .Blood Blood-Peripheral  Preliminary Report (20 Feb 2020 17:02):    No growth to date.    Culture - Blood (collected 19 Feb 2020 16:29)  Source: .Blood Blood-Peripheral  Preliminary Report (20 Feb 2020 17:02):    No growth to date.    ASSESSMENT & PLAN  79 year old female with abdominal abscess s/p IR drainage 2/19/20. Normalized WBC today.    - Advance to full liquid diet.  If tolerates, may have regular diet for dinner.  - Continue zosyn, follow up cultures per ID  - Drain monitoring, regular flushes  - OOB, pain control, SCDs  - Case discussed with Dr. Singh

## 2020-02-21 NOTE — DIETITIAN INITIAL EVALUATION ADULT. - OTHER INFO
79 year old female with abdominal abscess status post drainage and drain placement. with BMI this AM loose per patient. this AM diet advanced to full liquids spoke with PA. patient wanting to try to eat more food other then clears. patient reports with since October and especially starting in December with abdominal pain and low PO intake . weight loss reported usual weight at MD office in October 177# now weight 155# bed scale. NKFA follows consistent cho diet at home A1c 6.5% last time checked per patient.

## 2020-02-22 PROCEDURE — 99232 SBSQ HOSP IP/OBS MODERATE 35: CPT

## 2020-02-22 RX ORDER — ENOXAPARIN SODIUM 100 MG/ML
40 INJECTION SUBCUTANEOUS DAILY
Refills: 0 | Status: DISCONTINUED | OUTPATIENT
Start: 2020-02-22 | End: 2020-02-26

## 2020-02-22 RX ORDER — LACTOBACILLUS ACIDOPHILUS 100MM CELL
1 CAPSULE ORAL THREE TIMES A DAY
Refills: 0 | Status: DISCONTINUED | OUTPATIENT
Start: 2020-02-22 | End: 2020-02-26

## 2020-02-22 RX ADMIN — AMLODIPINE BESYLATE 10 MILLIGRAM(S): 2.5 TABLET ORAL at 05:29

## 2020-02-22 RX ADMIN — Medication 30 MILLIGRAM(S): at 05:53

## 2020-02-22 RX ADMIN — Medication 100 MILLIGRAM(S): at 21:28

## 2020-02-22 RX ADMIN — PIPERACILLIN AND TAZOBACTAM 25 GRAM(S): 4; .5 INJECTION, POWDER, LYOPHILIZED, FOR SOLUTION INTRAVENOUS at 21:28

## 2020-02-22 RX ADMIN — LOSARTAN POTASSIUM 100 MILLIGRAM(S): 100 TABLET, FILM COATED ORAL at 05:29

## 2020-02-22 RX ADMIN — Medication 30 MILLIGRAM(S): at 21:44

## 2020-02-22 RX ADMIN — Medication 1 TABLET(S): at 21:28

## 2020-02-22 RX ADMIN — Medication 200 MILLIGRAM(S): at 05:29

## 2020-02-22 RX ADMIN — ENOXAPARIN SODIUM 40 MILLIGRAM(S): 100 INJECTION SUBCUTANEOUS at 18:49

## 2020-02-22 RX ADMIN — PIPERACILLIN AND TAZOBACTAM 25 GRAM(S): 4; .5 INJECTION, POWDER, LYOPHILIZED, FOR SOLUTION INTRAVENOUS at 14:47

## 2020-02-22 RX ADMIN — Medication 1: at 08:29

## 2020-02-22 RX ADMIN — Medication 30 MILLIGRAM(S): at 21:29

## 2020-02-22 RX ADMIN — PIPERACILLIN AND TAZOBACTAM 25 GRAM(S): 4; .5 INJECTION, POWDER, LYOPHILIZED, FOR SOLUTION INTRAVENOUS at 05:29

## 2020-02-22 RX ADMIN — Medication 30 MILLIGRAM(S): at 05:38

## 2020-02-22 NOTE — PROGRESS NOTE ADULT - SUBJECTIVE AND OBJECTIVE BOX
HOSPITAL DAY: 3    SUBJECTIVE:  80 y/o F seen and examined at bedside. No overnight events. Patient with no new complaints at this time, states she is feeling well. She is tolerating full liquid diet. Admits to flatus and two loose bowel movements.  Patient denies any fevers, chills, chest pain, shortness of breath, abdominal pain, nausea, vomiting.    Vital Signs Last 24 Hrs  T(C): 36.8 (22 Feb 2020 07:19), Max: 36.8 (22 Feb 2020 07:19)  T(F): 98.2 (22 Feb 2020 07:19), Max: 98.2 (22 Feb 2020 07:19)  HR: 73 (22 Feb 2020 07:19) (73 - 76)  BP: 137/71 (22 Feb 2020 07:19) (111/64 - 156/75)  BP(mean): --  RR: 16 (22 Feb 2020 07:19) (16 - 17)  SpO2: 95% (22 Feb 2020 07:19) (95% - 97%)    PHYSICAL EXAM:  GENERAL: NAD, OOB, comfortably  HEAD:  Atraumatic, Normocephalic  ABDOMEN: Drains (x2) in place and empty, dressing dry and intact. Abdomen soft, non-tender to palpation, non-distended. +BS x 4 quadrants  NEUROLOGY: A&O x 3    I&O's Summary    21 Feb 2020 07:01  -  22 Feb 2020 07:00  --------------------------------------------------------  IN: 2010 mL / OUT: 600 mL / NET: 1410 mL      I&O's Detail    21 Feb 2020 07:01  -  22 Feb 2020 07:00  --------------------------------------------------------  IN:    dextrose 5% + sodium chloride 0.45% with potassium chloride 20 mEq/L: 1900 mL    IV PiggyBack: 100 mL    Other: 5 mL    Other: 5 mL  Total IN: 2010 mL    OUT:    Voided: 600 mL  Total OUT: 600 mL    Total NET: 1410 mL        MEDICATIONS  (STANDING):  amLODIPine   Tablet 10 milliGRAM(s) Oral daily  dextrose 5%. 1000 milliLiter(s) (50 mL/Hr) IV Continuous <Continuous>  dextrose 50% Injectable 12.5 Gram(s) IV Push once  dextrose 50% Injectable 25 Gram(s) IV Push once  dextrose 50% Injectable 25 Gram(s) IV Push once  insulin lispro (HumaLOG) corrective regimen sliding scale   SubCutaneous every 6 hours  losartan 100 milliGRAM(s) Oral daily  metoprolol succinate  milliGRAM(s) Oral at bedtime  metoprolol succinate  milliGRAM(s) Oral daily  piperacillin/tazobactam IVPB.. 3.375 Gram(s) IV Intermittent every 8 hours    MEDICATIONS  (PRN):  dextrose 40% Gel 15 Gram(s) Oral once PRN Blood Glucose LESS THAN 70 milliGRAM(s)/deciliter  glucagon  Injectable 1 milliGRAM(s) IntraMuscular once PRN Glucose LESS THAN 70 milligrams/deciliter  ketorolac   Injectable 15 milliGRAM(s) IV Push every 8 hours PRN Moderate Pain (4 - 6)  ketorolac   Injectable 30 milliGRAM(s) IV Push every 8 hours PRN Severe Pain (7 - 10)    LABS:                        10.1   8.07  )-----------( 381      ( 21 Feb 2020 07:08 )             31.2     02-21    141  |  109<H>  |  3<L>  ----------------------------<  154<H>  4.0   |  23  |  0.61    Ca    8.7      21 Feb 2020 07:08    ASSESSMENT  80 y/o F HOD3 with abdominal abscess s/p IR drainage 2/19/20. Tolerating FLD.     PLAN  - pain control, supportive care  - OOB, ambulate as tolerated  - Advance to regular diet  - Transition to PO abx with ID recommendations   - Monitor drain outputs, flush regularly   - Will discuss with Dr. Allen (covering for Dr. Singh today)    Surgical Team Contact Information  Spectralink: Ext: [phone] or [phone]  Pager: [phone] HOSPITAL DAY: 3    SUBJECTIVE:  78 y/o F seen and examined at bedside. No overnight events. Patient with no new complaints at this time, states she is feeling well. She is tolerating full liquid diet. Admits to flatus and two loose bowel movements.  Patient denies any fevers, chills, chest pain, shortness of breath, abdominal pain, nausea, vomiting.    Vital Signs Last 24 Hrs  T(C): 36.8 (22 Feb 2020 07:19), Max: 36.8 (22 Feb 2020 07:19)  T(F): 98.2 (22 Feb 2020 07:19), Max: 98.2 (22 Feb 2020 07:19)  HR: 73 (22 Feb 2020 07:19) (73 - 76)  BP: 137/71 (22 Feb 2020 07:19) (111/64 - 156/75)  BP(mean): --  RR: 16 (22 Feb 2020 07:19) (16 - 17)  SpO2: 95% (22 Feb 2020 07:19) (95% - 97%)    PHYSICAL EXAM:  GENERAL: NAD, OOB, comfortably  HEAD:  Atraumatic, Normocephalic  ABDOMEN: Drains (x2) in place and empty, dressing dry and intact. Abdomen soft, non-tender to palpation, non-distended. +BS x 4 quadrants  NEUROLOGY: A&O x 3    I&O's Summary    21 Feb 2020 07:01  -  22 Feb 2020 07:00  --------------------------------------------------------  IN: 2010 mL / OUT: 600 mL / NET: 1410 mL      I&O's Detail    21 Feb 2020 07:01  -  22 Feb 2020 07:00  --------------------------------------------------------  IN:    dextrose 5% + sodium chloride 0.45% with potassium chloride 20 mEq/L: 1900 mL    IV PiggyBack: 100 mL    Other: 5 mL    Other: 5 mL  Total IN: 2010 mL    OUT:    Voided: 600 mL  Total OUT: 600 mL    Total NET: 1410 mL        MEDICATIONS  (STANDING):  amLODIPine   Tablet 10 milliGRAM(s) Oral daily  dextrose 5%. 1000 milliLiter(s) (50 mL/Hr) IV Continuous <Continuous>  dextrose 50% Injectable 12.5 Gram(s) IV Push once  dextrose 50% Injectable 25 Gram(s) IV Push once  dextrose 50% Injectable 25 Gram(s) IV Push once  insulin lispro (HumaLOG) corrective regimen sliding scale   SubCutaneous every 6 hours  losartan 100 milliGRAM(s) Oral daily  metoprolol succinate  milliGRAM(s) Oral at bedtime  metoprolol succinate  milliGRAM(s) Oral daily  piperacillin/tazobactam IVPB.. 3.375 Gram(s) IV Intermittent every 8 hours    MEDICATIONS  (PRN):  dextrose 40% Gel 15 Gram(s) Oral once PRN Blood Glucose LESS THAN 70 milliGRAM(s)/deciliter  glucagon  Injectable 1 milliGRAM(s) IntraMuscular once PRN Glucose LESS THAN 70 milligrams/deciliter  ketorolac   Injectable 15 milliGRAM(s) IV Push every 8 hours PRN Moderate Pain (4 - 6)  ketorolac   Injectable 30 milliGRAM(s) IV Push every 8 hours PRN Severe Pain (7 - 10)    LABS:                        10.1   8.07  )-----------( 381      ( 21 Feb 2020 07:08 )             31.2     02-21    141  |  109<H>  |  3<L>  ----------------------------<  154<H>  4.0   |  23  |  0.61    Ca    8.7      21 Feb 2020 07:08    ASSESSMENT  78 y/o F HOD3 with abdominal abscess s/p IR drainage 2/19/20. Tolerating FLD.     PLAN  - pain control, supportive care  - OOB, ambulate as tolerated  - Advance to regular diet  - Transition to PO abx with ID recommendations   - Monitor drain outputs, flush regularly   - Repeat CT planned for Monday  - Will discuss with Dr. Allen (covering for Dr. Singh today)    Surgical Team Contact Information  Spectralink: Ext: [phone] or [phone]  Pager: [phone] HOSPITAL DAY: 3      SUBJECTIVE:  78 y/o F seen and examined at bedside. No overnight events. Patient with no new complaints at this time, states she is feeling well. She is tolerating full liquid diet. Admits to flatus and two loose bowel movements.  Patient denies any fevers, chills, chest pain, shortness of breath, abdominal pain, nausea, vomiting.    Vital Signs Last 24 Hrs  T(C): 36.8 (22 Feb 2020 07:19), Max: 36.8 (22 Feb 2020 07:19)  T(F): 98.2 (22 Feb 2020 07:19), Max: 98.2 (22 Feb 2020 07:19)  HR: 73 (22 Feb 2020 07:19) (73 - 76)  BP: 137/71 (22 Feb 2020 07:19) (111/64 - 156/75)  BP(mean): --  RR: 16 (22 Feb 2020 07:19) (16 - 17)  SpO2: 95% (22 Feb 2020 07:19) (95% - 97%)    PHYSICAL EXAM:  GENERAL: NAD, OOB, comfortably  HEAD:  Atraumatic, Normocephalic  ABDOMEN: Drains (x2) in place and empty, dressing dry and intact. Abdomen soft, non-tender to palpation, non-distended. +BS x 4 quadrants  NEUROLOGY: A&O x 3    I&O's Summary    21 Feb 2020 07:01  -  22 Feb 2020 07:00  --------------------------------------------------------  IN: 2010 mL / OUT: 600 mL / NET: 1410 mL      I&O's Detail    21 Feb 2020 07:01  -  22 Feb 2020 07:00  --------------------------------------------------------  IN:    dextrose 5% + sodium chloride 0.45% with potassium chloride 20 mEq/L: 1900 mL    IV PiggyBack: 100 mL    Other: 5 mL    Other: 5 mL  Total IN: 2010 mL    OUT:    Voided: 600 mL  Total OUT: 600 mL    Total NET: 1410 mL        MEDICATIONS  (STANDING):  amLODIPine   Tablet 10 milliGRAM(s) Oral daily  dextrose 5%. 1000 milliLiter(s) (50 mL/Hr) IV Continuous <Continuous>  dextrose 50% Injectable 12.5 Gram(s) IV Push once  dextrose 50% Injectable 25 Gram(s) IV Push once  dextrose 50% Injectable 25 Gram(s) IV Push once  insulin lispro (HumaLOG) corrective regimen sliding scale   SubCutaneous every 6 hours  losartan 100 milliGRAM(s) Oral daily  metoprolol succinate  milliGRAM(s) Oral at bedtime  metoprolol succinate  milliGRAM(s) Oral daily  piperacillin/tazobactam IVPB.. 3.375 Gram(s) IV Intermittent every 8 hours    MEDICATIONS  (PRN):  dextrose 40% Gel 15 Gram(s) Oral once PRN Blood Glucose LESS THAN 70 milliGRAM(s)/deciliter  glucagon  Injectable 1 milliGRAM(s) IntraMuscular once PRN Glucose LESS THAN 70 milligrams/deciliter  ketorolac   Injectable 15 milliGRAM(s) IV Push every 8 hours PRN Moderate Pain (4 - 6)  ketorolac   Injectable 30 milliGRAM(s) IV Push every 8 hours PRN Severe Pain (7 - 10)    LABS:                        10.1   8.07  )-----------( 381      ( 21 Feb 2020 07:08 )             31.2     02-21    141  |  109<H>  |  3<L>  ----------------------------<  154<H>  4.0   |  23  |  0.61    Ca    8.7      21 Feb 2020 07:08    ASSESSMENT  78 y/o F HOD3 with abdominal abscess s/p IR drainage 2/19/20. Tolerating FLD.     PLAN  - pain control, supportive care  - OOB, ambulate as tolerated  - Advance to regular diet  - Transition to PO abx with ID recommendations   - Monitor drain outputs, flush regularly   - Repeat CT planned for Monday  - Will discuss with Dr. Allen (covering for Dr. Singh today)    Surgical Team Contact Information  Spectralink: Ext: [phone] or [phone]  Pager: [phone] HOSPITAL DAY: 3      SUBJECTIVE:  78 y/o F seen and examined at bedside. No overnight events. Patient with no new complaints at this time, states she is feeling well. She is tolerating full liquid diet. Admits to flatus and two loose bowel movements.  Patient denies any fevers, chills, chest pain, shortness of breath, abdominal pain, nausea, vomiting.      Vital Signs Last 24 Hrs  T(F): 98.2 (22 Feb 2020 07:19), Max: 98.2 (22 Feb 2020 07:19)  HR: 73 (22 Feb 2020 07:19) (73 - 76)  BP: 137/71 (22 Feb 2020 07:19) (111/64 - 156/75)  RR: 16 (22 Feb 2020 07:19) (16 - 17)  SpO2: 95% (22 Feb 2020 07:19) (95% - 97%)      PHYSICAL EXAM:  GENERAL: NAD, OOB, comfortably  HEAD:  Atraumatic, Normocephalic  ABDOMEN: Drains (x2) in place and empty, dressing dry and intact. Abdomen soft, non-tender to palpation, non-distended. +BS x 4 quadrants  NEUROLOGY: A&O x 3      I&O's Summary    21 Feb 2020 07:01  -  22 Feb 2020 07:00  --------------------------------------------------------  IN: 2010 mL / OUT: 600 mL / NET: 1410 mL      I&O's Detail    21 Feb 2020 07:01  -  22 Feb 2020 07:00  --------------------------------------------------------  IN:    dextrose 5% + sodium chloride 0.45% with potassium chloride 20 mEq/L: 1900 mL    IV PiggyBack: 100 mL    Other: 5 mL    Other: 5 mL  Total IN: 2010 mL    OUT:    Voided: 600 mL  Total OUT: 600 mL    Total NET: 1410 mL      MEDICATIONS  (STANDING):  amLODIPine   Tablet 10 milliGRAM(s) Oral daily  dextrose 5%. 1000 milliLiter(s) (50 mL/Hr) IV Continuous <Continuous>  dextrose 50% Injectable 12.5 Gram(s) IV Push once  dextrose 50% Injectable 25 Gram(s) IV Push once  dextrose 50% Injectable 25 Gram(s) IV Push once  insulin lispro (HumaLOG) corrective regimen sliding scale   SubCutaneous every 6 hours  losartan 100 milliGRAM(s) Oral daily  metoprolol succinate  milliGRAM(s) Oral at bedtime  metoprolol succinate  milliGRAM(s) Oral daily  piperacillin/tazobactam IVPB.. 3.375 Gram(s) IV Intermittent every 8 hours      MEDICATIONS  (PRN):  dextrose 40% Gel 15 Gram(s) Oral once PRN Blood Glucose LESS THAN 70 milliGRAM(s)/deciliter  glucagon  Injectable 1 milliGRAM(s) IntraMuscular once PRN Glucose LESS THAN 70 milligrams/deciliter  ketorolac   Injectable 15 milliGRAM(s) IV Push every 8 hours PRN Moderate Pain (4 - 6)  ketorolac   Injectable 30 milliGRAM(s) IV Push every 8 hours PRN Severe Pain (7 - 10)      LABS:                        10.1   8.07  )-----------( 381      ( 21 Feb 2020 07:08 )             31.2     02-21    141  |  109<H>  |  3<L>  ----------------------------<  154<H>  4.0   |  23  |  0.61    Ca    8.7      21 Feb 2020 07:08      ASSESSMENT  78 y/o F HOD3 with abdominal abscess s/p IR drainage 2/19/20. Tolerating FLD.       PLAN  - pain control, supportive care  - OOB, ambulate as tolerated  - Advance to regular diet  - Transition to PO abx with ID recommendations   - Monitor drain outputs, flush regularly   - Repeat CT planned for Monday  - Will discuss with Dr. Allen (covering for Dr. Singh today)    Surgical Team Contact Information  Spectralink: Ext: [phone] or [phone]  Pager: [phone]

## 2020-02-22 NOTE — PROGRESS NOTE ADULT - SUBJECTIVE AND OBJECTIVE BOX
Patient is a 79y old  Female who presents with a chief complaint of Abdominal abscess (19 Feb 2020 18:56)    Asked to see patient for ID Consult  Interval History:Intraabd absc's. Poss ruptured AP    CENTRAL LINE:   [  ] YES       [x  ] NO  GAXIOLA:                 [  ] YES       [ x ] NO     PAST MEDICAL & SURGICAL HISTORY:  Hyperlipidemia  Diabetes  Hypertension  H/O tubal ligation    nonsmoker    REVIEW OF SYSTEMS:  All systems below were reviewed and are normal [  ]  Constitutional:  HEENT:  Lymph nodes:  ID:  Pulmonary:no cough sob  Cardiac:no CP  GI:no NV abd pain. + softening of stool  Renal:  Musculoskeletal:  Neuro:  Endocrine:  Skin:  All other systems above were reviewed and are normal   [ x ]    Allergies  Allergies    Demerol (Anaphylaxis)    Intolerances          PHYSICAL EXAM:  Constitutional:  HEENT:  Lymph nodes:  Neck:  Lungs:clear  Heart:rr  Abdomen:soft nontender. multiiple drains RLQ-draining purulent material  Renal:  Extremities:  Musculoskeletal:  Neurologic:  Vascular:  Endocrine:  Skin:  All of the above normal except for written comments [x  ]    LABORATORY:  CBC Full  -  ( 21 Feb 2020 07:08 )  WBC Count : 8.07 K/uL  RBC Count : 3.43 M/uL  Hemoglobin : 10.1 g/dL  Hematocrit : 31.2 %  Platelet Count - Automated : 381 K/uL  Mean Cell Volume : 91.0 fl  Mean Cell Hemoglobin : 29.4 pg  Mean Cell Hemoglobin Concentration : 32.4 gm/dL  Auto Neutrophil # : x  Auto Lymphocyte # : x  Auto Monocyte # : x  Auto Eosinophil # : x  Auto Basophil # : x  Auto Neutrophil % : x  Auto Lymphocyte % : x  Auto Monocyte % : x  Auto Eosinophil % : x  Auto Basophil % : x    02-21    141  |  109<H>  |  3<L>  ----------------------------<  154<H>  4.0   |  23  |  0.61    Ca    8.7      21 Feb 2020 07:08        Vanco. trough:  Genta. trough:      Radiology:< from: CT Aspiration Abscess Hematoma, Cyst (02.19.20 @ 17:26) >  EXAM:  CT ASP ABSC HEMATOMA CYST#                            PROCEDURE DATE:  02/19/2020          INTERPRETATION:    CT scan guided right flank and right intra-abdominal abscess aspiration  and drainage catheter placement :  CT abscessogram:  Clinical History:    Abdominal abscess. Patient had a drainage catheter placed in outside facility and discontinued the catheter of the follow-up CT scan and resolution of abscess.    However, patient had fever, follow-up CT showed multiple right flank collections with thick wall suggestive of abscesses in the right flank subcutaneous plane, right intra-abdominal retrocecal collections.    Image guided procedure requested by Dr. Singh .    Technique:    An informed consent obtained from the patient inthe intervention radiology suite in presence of intervention radiology nurse  . Patient placed supine on the CT scan table. Limited noncontrast CT abdomen skin markers showed collections. The skin sites marked. Time out procedure was performed. The marked site prepped and draped in sterile fashion. Under noncontrast limited CT abdomen the subcutaneous abscess was accessed after anesthetizing the tract with 1% lidocaine.  The micropuncture needle was exchanged over a microwire to a microcatheter. 5 cc of thick pus was aspirated. Fluid sent to the lab for further analysis  subsequently, microcatheter exchanged over a Bentson wire to a 8 Cuban fascial dilator, to a 8 Cuban multipurpose pigtail drainage catheter lies in the subcutaneous collection. Approximately 15 cc of thick pus aspirated.. The catheter anchored to the skin using StatLock device. The abscess cavity was irrigated with normal saline till the returns were clear. . Hemostasis secured by manual compression.   Following similarsteps, Limited noncontrast CT abdomen skin markers showed collections. The skin sites marked. Under noncontrast limited CT abdomen the intra-abdominal collection was accessed after anesthetizing the tract with 1% lidocaine.  The micropuncture needle was exchanged over a microwire to a microcatheter. 5 cc of thick pus was aspirated. Fluid sent to the lab for further analysis  subsequently, microcatheter exchanged over a Bentson wire to a 8 Cuban fascial dilator, to a 8 Cuban multipurpose pigtaildrainage catheter. Approximately 15 cc of thick pus aspirated.. The catheter anchored to the skin using StatLock device. The abscess cavity was irrigated with normal saline till the returns were clear. . Hemostasis secured by manual compression.  At this time, approximately 45 cc of nonionic contrast Omnipaque injected through the intra-abdominal abscess drainage catheter. Follow-up CT scan was done to assess the communication of abscess.    Patient tolerated the procedure well with 1% lidocaineand sedation provided by anesthesiologist . No complications noted during the procedure. Immediately after the procedure. Patient transferred to the recovery unit in stable condition for observation and management Patient's vital signs were monitoredthroughout the procedure.  Patient had chills and right cusp postprocedure in the recovery unit.. Vital signs were normal. Temperature 98.2, respiratory rate 20, oxygen saturation 97%. Blood pressure 157 x 65 mmHg. Dr. Singh informed at 5:50 PM.  Findings:    Right flank subcutaneous abscess with thick pus. Fluid sent for lab analysis. Approximately 15 cc of thick pus aspirated.    The right lower quadrant intra-abdominal abscesses show communication on contrast abscessogram. Approximately 15 ccof thick pus aspirated from this abscess as well. Abscess collection sent for lab analysis.    Impression:    Successful CT-guided right flank subcutaneous and intra-abdominal abscess drainage catheters placement. Pus sent for lab analysis into separate containers.    45 cc nonionic contrast utilized for the procedure.    Total exam .45 [id number] .                REHANA SALINAS M.D., ATTENDING RADIOLOGIST  This document has been electronically signed. Feb 19 2020  6:20PM          < end of copied text >      Microbiology:    Culture - Abscess with Gram Stain (collected 20 Feb 2020 01:07)  Source: .Abscess Abdominal Fluid  Final Report (21 Feb 2020 18:52):    Moderate Escherichia coli    Rare Streptococcus constellatus "Susceptibilities not performed"    Numerous Mixed Anaerobic Carmen  Organism: Escherichia coli (21 Feb 2020 18:52)  Organism: Escherichia coli (21 Feb 2020 18:52)      -  Amikacin: S <=16      -  Amoxicillin/Clavulanic Acid: I 16/8      -  Ampicillin: R >16 These ampicillin results predict results for amoxicillin      -  Ampicillin/Sulbactam: R >16/8 Enterobacter, Citrobacter, and Serratia may develop resistance during prolonged therapy (3-4 days)      -  Aztreonam: S <=4      -  Cefazolin: I 4 Enterobacter, Citrobacter, and Serratia may develop resistance during prolonged therapy (3-4 days)      -  Cefepime: S <=2      -  Cefoxitin: S <=8      -  Ceftriaxone: S <=1 Enterobacter, Citrobacter, and Serratia may develop resistance during prolonged therapy      -  Ciprofloxacin: S <=1      -  Ertapenem: S <=0.5      -  Gentamicin: S <=2      -  Imipenem: S <=1      -  Levofloxacin: S <=2      -  Meropenem: S <=1      -  Piperacillin/Tazobactam: S <=8      -  Tobramycin: S <=2      -  Trimethoprim/Sulfamethoxazole: S <=2/38      Method Type: JOSE C    Culture - Abscess with Gram Stain (collected 20 Feb 2020 01:05)  Source: .Abscess Abdominal Fluid  Final Report (21 Feb 2020 18:49):    Numerous Escherichia coli    Numerous Streptococcus constellatus "Susceptibilities not performed"    Numerous Mixed Anaerobic Carmen  Organism: Escherichia coli (21 Feb 2020 18:49)  Organism: Escherichia coli (21 Feb 2020 18:49)      -  Amikacin: S <=16      -  Amoxicillin/Clavulanic Acid: I 16/8      -  Ampicillin: R >16 These ampicillin results predict results for amoxicillin      -  Ampicillin/Sulbactam: R >16/8 Enterobacter, Citrobacter, and Serratia may develop resistance during prolonged therapy (3-4 days)      -  Aztreonam: S <=4      -  Cefazolin: I 4 Enterobacter, Citrobacter, and Serratia may develop resistance during prolonged therapy (3-4 days)      -  Cefepime: S <=2      -  Cefoxitin: S <=8      -  Ceftriaxone: S <=1 Enterobacter, Citrobacter, and Serratia may develop resistance during prolonged therapy      -  Ciprofloxacin: S <=1      -  Ertapenem: S <=0.5      -  Gentamicin: S <=2      -  Imipenem: S <=1      -  Levofloxacin: S <=2      -  Meropenem: S <=1      -  Piperacillin/Tazobactam: S <=8      -  Tobramycin: S <=2      -  Trimethoprim/Sulfamethoxazole: S <=2/38      Method Type: JOSE C    Culture - Blood (collected 19 Feb 2020 16:29)  Source: .Blood Blood-Peripheral  Preliminary Report (20 Feb 2020 17:02):    No growth to date.    Culture - Blood (collected 19 Feb 2020 16:29)  Source: .Blood Blood-Peripheral  Preliminary Report (20 Feb 2020 17:02):    No growth to date.

## 2020-02-23 ENCOUNTER — TRANSCRIPTION ENCOUNTER (OUTPATIENT)
Age: 80
End: 2020-02-23

## 2020-02-23 LAB
ANION GAP SERPL CALC-SCNC: 9 MMOL/L — SIGNIFICANT CHANGE UP (ref 5–17)
BUN SERPL-MCNC: 7 MG/DL — SIGNIFICANT CHANGE UP (ref 7–23)
CALCIUM SERPL-MCNC: 9.9 MG/DL — SIGNIFICANT CHANGE UP (ref 8.5–10.1)
CHLORIDE SERPL-SCNC: 107 MMOL/L — SIGNIFICANT CHANGE UP (ref 96–108)
CO2 SERPL-SCNC: 27 MMOL/L — SIGNIFICANT CHANGE UP (ref 22–31)
CREAT SERPL-MCNC: 0.79 MG/DL — SIGNIFICANT CHANGE UP (ref 0.5–1.3)
GLUCOSE SERPL-MCNC: 138 MG/DL — HIGH (ref 70–99)
HCT VFR BLD CALC: 37.2 % — SIGNIFICANT CHANGE UP (ref 34.5–45)
HGB BLD-MCNC: 12.1 G/DL — SIGNIFICANT CHANGE UP (ref 11.5–15.5)
MAGNESIUM SERPL-MCNC: 2.2 MG/DL — SIGNIFICANT CHANGE UP (ref 1.6–2.6)
MCHC RBC-ENTMCNC: 29.6 PG — SIGNIFICANT CHANGE UP (ref 27–34)
MCHC RBC-ENTMCNC: 32.5 GM/DL — SIGNIFICANT CHANGE UP (ref 32–36)
MCV RBC AUTO: 91 FL — SIGNIFICANT CHANGE UP (ref 80–100)
NRBC # BLD: 0 /100 WBCS — SIGNIFICANT CHANGE UP (ref 0–0)
PLATELET # BLD AUTO: 515 K/UL — HIGH (ref 150–400)
POTASSIUM SERPL-MCNC: 3.8 MMOL/L — SIGNIFICANT CHANGE UP (ref 3.5–5.3)
POTASSIUM SERPL-SCNC: 3.8 MMOL/L — SIGNIFICANT CHANGE UP (ref 3.5–5.3)
RBC # BLD: 4.09 M/UL — SIGNIFICANT CHANGE UP (ref 3.8–5.2)
RBC # FLD: 14.2 % — SIGNIFICANT CHANGE UP (ref 10.3–14.5)
SODIUM SERPL-SCNC: 143 MMOL/L — SIGNIFICANT CHANGE UP (ref 135–145)
WBC # BLD: 10.51 K/UL — HIGH (ref 3.8–10.5)
WBC # FLD AUTO: 10.51 K/UL — HIGH (ref 3.8–10.5)

## 2020-02-23 PROCEDURE — 99231 SBSQ HOSP IP/OBS SF/LOW 25: CPT

## 2020-02-23 RX ORDER — IBUPROFEN 200 MG
600 TABLET ORAL EVERY 6 HOURS
Refills: 0 | Status: DISCONTINUED | OUTPATIENT
Start: 2020-02-23 | End: 2020-02-26

## 2020-02-23 RX ORDER — ACETAMINOPHEN 500 MG
650 TABLET ORAL EVERY 6 HOURS
Refills: 0 | Status: COMPLETED | OUTPATIENT
Start: 2020-02-23 | End: 2020-02-25

## 2020-02-23 RX ADMIN — Medication 1: at 12:13

## 2020-02-23 RX ADMIN — AMLODIPINE BESYLATE 10 MILLIGRAM(S): 2.5 TABLET ORAL at 05:09

## 2020-02-23 RX ADMIN — Medication 1 TABLET(S): at 14:14

## 2020-02-23 RX ADMIN — Medication 200 MILLIGRAM(S): at 05:09

## 2020-02-23 RX ADMIN — PIPERACILLIN AND TAZOBACTAM 25 GRAM(S): 4; .5 INJECTION, POWDER, LYOPHILIZED, FOR SOLUTION INTRAVENOUS at 05:09

## 2020-02-23 RX ADMIN — Medication 100 MILLIGRAM(S): at 21:47

## 2020-02-23 RX ADMIN — Medication 1 TABLET(S): at 21:47

## 2020-02-23 RX ADMIN — Medication 30 MILLIGRAM(S): at 22:27

## 2020-02-23 RX ADMIN — Medication 30 MILLIGRAM(S): at 14:27

## 2020-02-23 RX ADMIN — Medication 1 TABLET(S): at 05:09

## 2020-02-23 RX ADMIN — PIPERACILLIN AND TAZOBACTAM 25 GRAM(S): 4; .5 INJECTION, POWDER, LYOPHILIZED, FOR SOLUTION INTRAVENOUS at 14:12

## 2020-02-23 RX ADMIN — Medication 30 MILLIGRAM(S): at 22:12

## 2020-02-23 RX ADMIN — PIPERACILLIN AND TAZOBACTAM 25 GRAM(S): 4; .5 INJECTION, POWDER, LYOPHILIZED, FOR SOLUTION INTRAVENOUS at 21:47

## 2020-02-23 RX ADMIN — ENOXAPARIN SODIUM 40 MILLIGRAM(S): 100 INJECTION SUBCUTANEOUS at 12:13

## 2020-02-23 RX ADMIN — LOSARTAN POTASSIUM 100 MILLIGRAM(S): 100 TABLET, FILM COATED ORAL at 05:09

## 2020-02-23 RX ADMIN — Medication 30 MILLIGRAM(S): at 14:12

## 2020-02-23 NOTE — DISCHARGE NOTE PROVIDER - CARE PROVIDERS DIRECT ADDRESSES
,[email or web address],DirectAddress_Unknown [email or web address],DirectAddress_Unknown,DirectAddress_Unknown

## 2020-02-23 NOTE — DISCHARGE NOTE PROVIDER - CARE PROVIDER_API CALL
Pantera Singh)  Surgery  [address] [place] [address]  Phone: [phone]  Fax: [phone]  Follow Up Time: 1 week    Too Shipman ()  Internal Medicine  [address] [place] [address]  Phone: [phone]  Fax: [phone]  Follow Up Time: Routine Pantera Singh)  Surgery  [address] [place] [address]  Phone: [phone]  Fax: [phone]  Follow Up Time: 1 week    Too Shipman ()  Internal Medicine  [address] [place] [address]  Phone: [phone]  Fax: [phone]  Follow Up Time: Routine    Omkar Kirk)  Infectious Disease; Internal Medicine  [address] [place] [id number]  Phone: [phone]  Fax: [phone]  Follow Up Time:

## 2020-02-23 NOTE — PROGRESS NOTE ADULT - SUBJECTIVE AND OBJECTIVE BOX
HOSPITAL DAY: 4    SUBJECTIVE:  Patient seen and examined at bedside.  No overnight events.  Patient with no new complaints at this time, tolerating REG diet, admits to flatus and diarrhea bowel movements.  Patient denies any fevers, chills, chest pain, shortness of breath, abdominal pain, nausea, vomiting or diarrhea.    Vital Signs Last 24 Hrs  T(C): 36.5 (22 Feb 2020 23:29), Max: 36.9 (22 Feb 2020 15:10)  T(F): 97.7 (22 Feb 2020 23:29), Max: 98.4 (22 Feb 2020 15:10)  HR: 80 (23 Feb 2020 05:05) (73 - 80)  BP: 152/73 (23 Feb 2020 05:05) (130/73 - 157/72)  BP(mean): --  RR: 16 (22 Feb 2020 23:29) (16 - 16)  SpO2: 96% (22 Feb 2020 23:29) (95% - 96%)    PHYSICAL EXAM:  GENERAL: No acute distress, well-developed  HEAD:  Atraumatic, Normocephalic  ABDOMEN: Soft, mildly tender R flank/RLQ 2/2 drains, drains x2 in place with minimal purulent/sanguinous fluid. non-distended; bowel sounds+  NEUROLOGY: A&O x 3, no focal deficits    I&O's Summary    21 Feb 2020 07:01  -  22 Feb 2020 07:00  --------------------------------------------------------  IN: 2010 mL / OUT: 600 mL / NET: 1410 mL    22 Feb 2020 07:01  -  23 Feb 2020 05:46  --------------------------------------------------------  IN: 1095 mL / OUT: 475 mL / NET: 620 mL      I&O's Detail    21 Feb 2020 07:01  -  22 Feb 2020 07:00  --------------------------------------------------------  IN:    dextrose 5% + sodium chloride 0.45% with potassium chloride 20 mEq/L: 1900 mL    IV PiggyBack: 100 mL    Other: 5 mL    Other: 5 mL  Total IN: 2010 mL    OUT:    Voided: 600 mL  Total OUT: 600 mL    Total NET: 1410 mL      22 Feb 2020 07:01  -  23 Feb 2020 05:46  --------------------------------------------------------  IN:    Oral Fluid: 1080 mL    Other: 5 mL    Other: 10 mL  Total IN: 1095 mL    OUT:    Other: 25 mL    Voided: 450 mL  Total OUT: 475 mL    Total NET: 620 mL        MEDICATIONS  (STANDING):  amLODIPine   Tablet 10 milliGRAM(s) Oral daily  dextrose 5%. 1000 milliLiter(s) (50 mL/Hr) IV Continuous <Continuous>  dextrose 50% Injectable 12.5 Gram(s) IV Push once  dextrose 50% Injectable 25 Gram(s) IV Push once  dextrose 50% Injectable 25 Gram(s) IV Push once  enoxaparin Injectable 40 milliGRAM(s) SubCutaneous daily  insulin lispro (HumaLOG) corrective regimen sliding scale   SubCutaneous every 6 hours  lactobacillus acidophilus 1 Tablet(s) Oral three times a day  losartan 100 milliGRAM(s) Oral daily  metoprolol succinate  milliGRAM(s) Oral at bedtime  metoprolol succinate  milliGRAM(s) Oral daily  piperacillin/tazobactam IVPB.. 3.375 Gram(s) IV Intermittent every 8 hours    MEDICATIONS  (PRN):  dextrose 40% Gel 15 Gram(s) Oral once PRN Blood Glucose LESS THAN 70 milliGRAM(s)/deciliter  glucagon  Injectable 1 milliGRAM(s) IntraMuscular once PRN Glucose LESS THAN 70 milligrams/deciliter  ketorolac   Injectable 15 milliGRAM(s) IV Push every 8 hours PRN Moderate Pain (4 - 6)  ketorolac   Injectable 30 milliGRAM(s) IV Push every 8 hours PRN Severe Pain (7 - 10)    LABS:                        10.1   8.07  )-----------( 381      ( 21 Feb 2020 07:08 )             31.2     02-21    141  |  109<H>  |  3<L>  ----------------------------<  154<H>  4.0   |  23  |  0.61    Ca    8.7      21 Feb 2020 07:08    RADIOLOGY & ADDITIONAL STUDIES:    ASSESSMENT    79y Female hospital day 4 with abdominal abscess s/p IR drainage 2/19/20. Tolerating REG diet.     PLAN  - pain control, supportive care  - OOB, ambulate as tolerated  - REG diet  - Transition to PO abx after imaging Monday  - Monitor drain outputs, flush regularly, minimal outputs for either  - Repeat CT planned for Monday  - Team will discuss with Dr. Allen (covering for Dr. Singh today)    Surgical Team Contact Information  Spectralink: Ext: [phone] or [phone]  Pager: [phone] HOSPITAL DAY: 4      SUBJECTIVE:  Patient seen and examined at bedside.  No overnight events.  Patient with no new complaints at this time, tolerating REG diet, admits to flatus and diarrhea bowel movements.  Patient denies any fevers, chills, chest pain, shortness of breath, abdominal pain, nausea, vomiting or diarrhea.      Vital Signs Last 24 Hrs  T(F): 97.7 (22 Feb 2020 23:29), Max: 98.4 (22 Feb 2020 15:10)  HR: 80 (23 Feb 2020 05:05) (73 - 80)  BP: 152/73 (23 Feb 2020 05:05) (130/73 - 157/72)  RR: 16 (22 Feb 2020 23:29) (16 - 16)  SpO2: 96% (22 Feb 2020 23:29) (95% - 96%)      PHYSICAL EXAM:  GENERAL: No acute distress, well-developed  HEAD:  Atraumatic, Normocephalic  ABDOMEN: Soft, mildly tender R flank/RLQ 2/2 drains, drains x2 in place with minimal purulent/sanguinous fluid. non-distended; bowel sounds+  NEUROLOGY: A&O x 3, no focal deficits      I&O's Summary    21 Feb 2020 07:01  -  22 Feb 2020 07:00  --------------------------------------------------------  IN: 2010 mL / OUT: 600 mL / NET: 1410 mL    22 Feb 2020 07:01  -  23 Feb 2020 05:46  --------------------------------------------------------  IN: 1095 mL / OUT: 475 mL / NET: 620 mL      I&O's Detail    21 Feb 2020 07:01  -  22 Feb 2020 07:00  --------------------------------------------------------  IN:    dextrose 5% + sodium chloride 0.45% with potassium chloride 20 mEq/L: 1900 mL    IV PiggyBack: 100 mL    Other: 5 mL    Other: 5 mL  Total IN: 2010 mL    OUT:    Voided: 600 mL  Total OUT: 600 mL    Total NET: 1410 mL      22 Feb 2020 07:01  -  23 Feb 2020 05:46  --------------------------------------------------------  IN:    Oral Fluid: 1080 mL    Other: 5 mL    Other: 10 mL  Total IN: 1095 mL    OUT:    Other: 25 mL    Voided: 450 mL  Total OUT: 475 mL    Total NET: 620 mL      MEDICATIONS  (STANDING):  amLODIPine   Tablet 10 milliGRAM(s) Oral daily  dextrose 5%. 1000 milliLiter(s) (50 mL/Hr) IV Continuous <Continuous>  dextrose 50% Injectable 12.5 Gram(s) IV Push once  dextrose 50% Injectable 25 Gram(s) IV Push once  dextrose 50% Injectable 25 Gram(s) IV Push once  enoxaparin Injectable 40 milliGRAM(s) SubCutaneous daily  insulin lispro (HumaLOG) corrective regimen sliding scale   SubCutaneous every 6 hours  lactobacillus acidophilus 1 Tablet(s) Oral three times a day  losartan 100 milliGRAM(s) Oral daily  metoprolol succinate  milliGRAM(s) Oral at bedtime  metoprolol succinate  milliGRAM(s) Oral daily  piperacillin/tazobactam IVPB.. 3.375 Gram(s) IV Intermittent every 8 hours      MEDICATIONS  (PRN):  dextrose 40% Gel 15 Gram(s) Oral once PRN Blood Glucose LESS THAN 70 milliGRAM(s)/deciliter  glucagon  Injectable 1 milliGRAM(s) IntraMuscular once PRN Glucose LESS THAN 70 milligrams/deciliter  ketorolac   Injectable 15 milliGRAM(s) IV Push every 8 hours PRN Moderate Pain (4 - 6)  ketorolac   Injectable 30 milliGRAM(s) IV Push every 8 hours PRN Severe Pain (7 - 10)    LABS:                        10.1   8.07  )-----------( 381      ( 21 Feb 2020 07:08 )             31.2     02-21    141  |  109<H>  |  3<L>  ----------------------------<  154<H>  4.0   |  23  |  0.61    Ca    8.7      21 Feb 2020 07:08    RADIOLOGY & ADDITIONAL STUDIES:    None; CT tomorrow as interval imaging study.      ASSESSMENT    79y Female hospital day 4 with abdominal abscess s/p IR drainage 2/19/20. Tolerating REG diet.       PLAN  - pain control, supportive care  - OOB, ambulate as tolerated  - REG diet  - Transition to PO abx after imaging Monday  - Monitor drain outputs, flush regularly, minimal outputs for either  - Repeat CT planned for Monday  - Team will discuss with Dr. Allen (covering for Dr. Singh today)    Surgical Team Contact Information  Spectralink: Ext: [phone] or [phone]  Pager: [phone]

## 2020-02-23 NOTE — DISCHARGE NOTE PROVIDER - NSDCFUADDAPPT_GEN_ALL_CORE_FT
Please call for follow-up appointment.    May call for outpatient colonoscopy with Dr. Shipman' group if you do not currently have a GI doctor. Follow up with Dr. Singh in his office in one week. Please call for follow-up appointment.    May call for outpatient colonoscopy with Dr. Shipman' group if you do not currently have a GI doctor.

## 2020-02-23 NOTE — DISCHARGE NOTE PROVIDER - PROVIDER TOKENS
PROVIDER:[TOKEN:[[id number]],FOLLOWUP:[1 week]],PROVIDER:[TOKEN:[[id number]],FOLLOWUP:[Routine]] PROVIDER:[TOKEN:[[id number]],FOLLOWUP:[1 week]],PROVIDER:[TOKEN:[[id number]],FOLLOWUP:[Routine]],PROVIDER:[TOKEN:[[id number]]]

## 2020-02-23 NOTE — DISCHARGE NOTE PROVIDER - NSDCMRMEDTOKEN_GEN_ALL_CORE_FT
amLODIPine 10 mg oral tablet: 1 tab(s) orally once a day  aspirin 81 mg oral tablet: 1 tab(s) orally once a day.      atorvastatin 10 mg oral tablet: 1 tab(s) orally once a day  CoQ10 300 mg oral capsule: 1 cap(s) orally once a day  losartan 100 mg oral tablet: 1 tab(s) orally once a day  metFORMIN 500 mg oral tablet, extended release: 1 tab(s) orally 2 times a day with breakfast and dinner  Toprol- mg oral tablet, extended release: 1 tab(s) orally once a day (in the evening)  Toprol- mg oral tablet, extended release: 1 tab(s) orally once a day (in the morning)  Vitamin D3: 1 tab(s) orally once a day amLODIPine 10 mg oral tablet: 1 tab(s) orally once a day  amoxicillin-clavulanate 875 mg-125 mg oral tablet: 1 tab(s) orally every 12 hours  aspirin 81 mg oral tablet: 1 tab(s) orally once a day.      atorvastatin 10 mg oral tablet: 1 tab(s) orally once a day  CoQ10 300 mg oral capsule: 1 cap(s) orally once a day  Flagyl 500 mg oral tablet: 1 tab(s) orally every 8 hours  losartan 100 mg oral tablet: 1 tab(s) orally once a day  metFORMIN 500 mg oral tablet, extended release: 1 tab(s) orally 2 times a day with breakfast and dinner  Toprol- mg oral tablet, extended release: 1 tab(s) orally once a day (in the evening)  Toprol- mg oral tablet, extended release: 1 tab(s) orally once a day (in the morning)  Vitamin D3: 1 tab(s) orally once a day

## 2020-02-23 NOTE — DISCHARGE NOTE PROVIDER - NSDCACTIVITY_GEN_ALL_CORE
Showering allowed/Do not make important decisions/Walking - Indoors allowed/Do not drive or operate machinery/Walking - Outdoors allowed/No heavy lifting/straining

## 2020-02-23 NOTE — DISCHARGE NOTE PROVIDER - NSDCCPTREATMENT_GEN_ALL_CORE_FT
PRINCIPAL PROCEDURE  Procedure: Drainage of abdominal abscess  Findings and Treatment: WITH INTERVENTIONAL RADIOLOGY

## 2020-02-23 NOTE — PROGRESS NOTE ADULT - SUBJECTIVE AND OBJECTIVE BOX
Patient is a 79y old  Female who presents with a chief complaint of Abdominal abscess (19 Feb 2020 18:56)    Asked to see patient for ID Consult  Interval History:Intraabd absc's. Poss ruptured AP    CENTRAL LINE:   [  ] YES       [x  ] NO  GAXIOLA:                 [  ] YES       [ x ] NO     PAST MEDICAL & SURGICAL HISTORY:  Hyperlipidemia  Diabetes  Hypertension  H/O tubal ligation    nonsmoker    REVIEW OF SYSTEMS:  All systems below were reviewed and are normal [  ]  Constitutional:  HEENT:  Lymph nodes:  ID:  Pulmonary:no cough sob  Cardiac:no CP  GI:no NV abd pain. + softening of stool  Renal:  Musculoskeletal:  Neuro:  Endocrine:  Skin:  All other systems above were reviewed and are normal   [ x ]    Allergies  Allergies    Demerol (Anaphylaxis)    Intolerances          PHYSICAL EXAM:  Constitutional:  HEENT:  Lymph nodes:  Neck:  Lungs:clear  Heart:rr  Abdomen:soft nontender. multiiple drains RLQ-draining purulent material  Renal:  Extremities:  Musculoskeletal:  Neurologic:  Vascular:  Endocrine:  Skin:  All of the above normal except for written comments [x  ]    LABORATORY:  CBC Full  -  ( 23 Feb 2020 06:05 )  WBC Count : 10.51 K/uL  RBC Count : 4.09 M/uL  Hemoglobin : 12.1 g/dL  Hematocrit : 37.2 %  Platelet Count - Automated : 515 K/uL  Mean Cell Volume : 91.0 fl  Mean Cell Hemoglobin : 29.6 pg  Mean Cell Hemoglobin Concentration : 32.5 gm/dL  Auto Neutrophil # : x  Auto Lymphocyte # : x  Auto Monocyte # : x  Auto Eosinophil # : x  Auto Basophil # : x  Auto Neutrophil % : x  Auto Lymphocyte % : x  Auto Monocyte % : x  Auto Eosinophil % : x  Auto Basophil % : x    02-23    143  |  107  |  7   ----------------------------<  138<H>  3.8   |  27  |  0.79    Ca    9.9      23 Feb 2020 06:05  Mg     2.2     02-23        Vanco. trough:  Genta. trough:      Radiology:< from: CT Aspiration Abscess Hematoma, Cyst (02.19.20 @ 17:26) >  EXAM:  CT ASP ABSC HEMATOMA CYST#                            PROCEDURE DATE:  02/19/2020          INTERPRETATION:    CT scan guided right flank and right intra-abdominal abscess aspiration  and drainage catheter placement :  CT abscessogram:  Clinical History:    Abdominal abscess. Patient had a drainage catheter placed in outside facility and discontinued the catheter of the follow-up CT scan and resolution of abscess.    However, patient had fever, follow-up CT showed multiple right flank collections with thick wall suggestive of abscesses in the right flank subcutaneous plane, right intra-abdominal retrocecal collections.    Image guided procedure requested by Dr. Singh .    Technique:    An informed consent obtained from the patient inthe intervention radiology suite in presence of intervention radiology nurse  . Patient placed supine on the CT scan table. Limited noncontrast CT abdomen skin markers showed collections. The skin sites marked. Time out procedure was performed. The marked site prepped and draped in sterile fashion. Under noncontrast limited CT abdomen the subcutaneous abscess was accessed after anesthetizing the tract with 1% lidocaine.  The micropuncture needle was exchanged over a microwire to a microcatheter. 5 cc of thick pus was aspirated. Fluid sent to the lab for further analysis  subsequently, microcatheter exchanged over a Bentson wire to a 8 [place] fascial dilator, to a 8 [place] multipurpose pigtail drainage catheter lies in the subcutaneous collection. Approximately 15 cc of thick pus aspirated.. The catheter anchored to the skin using StatLock device. The abscess cavity was irrigated with normal saline till the returns were clear. . Hemostasis secured by manual compression.   Following similarsteps, Limited noncontrast CT abdomen skin markers showed collections. The skin sites marked. Under noncontrast limited CT abdomen the intra-abdominal collection was accessed after anesthetizing the tract with 1% lidocaine.  The micropuncture needle was exchanged over a microwire to a microcatheter. 5 cc of thick pus was aspirated. Fluid sent to the lab for further analysis  subsequently, microcatheter exchanged over a Bentson wire to a 8 [place] fascial dilator, to a 8 [place] multipurpose pigtaildrainage catheter. Approximately 15 cc of thick pus aspirated.. The catheter anchored to the skin using StatLock device. The abscess cavity was irrigated with normal saline till the returns were clear. . Hemostasis secured by manual compression.  At this time, approximately 45 cc of nonionic contrast Omnipaque injected through the intra-abdominal abscess drainage catheter. Follow-up CT scan was done to assess the communication of abscess.    Patient tolerated the procedure well with 1% lidocaineand sedation provided by anesthesiologist . No complications noted during the procedure. Immediately after the procedure. Patient transferred to the recovery unit in stable condition for observation and management Patient's vital signs were monitoredthroughout the procedure.  Patient had chills and right cusp postprocedure in the recovery unit.. Vital signs were normal. Temperature 98.2, respiratory rate 20, oxygen saturation 97%. Blood pressure 157 x 65 mmHg. Dr. Singh informed at 5:50 PM.  Findings:    Right flank subcutaneous abscess with thick pus. Fluid sent for lab analysis. Approximately 15 cc of thick pus aspirated.    The right lower quadrant intra-abdominal abscesses show communication on contrast abscessogram. Approximately 15 ccof thick pus aspirated from this abscess as well. Abscess collection sent for lab analysis.    Impression:    Successful CT-guided right flank subcutaneous and intra-abdominal abscess drainage catheters placement. Pus sent for lab analysis into separate containers.    45 cc nonionic contrast utilized for the procedure.    Total exam .45 [id number] .                REHANA SALINAS M.D., ATTENDING RADIOLOGIST  This document has been electronically signed. Feb 19 2020  6:20PM          < end of copied text >      Microbiology:    Culture - Abscess with Gram Stain (collected 20 Feb 2020 01:07)  Source: .Abscess Abdominal Fluid  Final Report (21 Feb 2020 18:52):    Moderate Escherichia coli    Rare Streptococcus constellatus "Susceptibilities not performed"    Numerous Mixed Anaerobic Carmen  Organism: Escherichia coli (21 Feb 2020 18:52)  Organism: Escherichia coli (21 Feb 2020 18:52)      -  Amikacin: S <=16      -  Amoxicillin/Clavulanic Acid: I 16/8      -  Ampicillin: R >16 These ampicillin results predict results for amoxicillin      -  Ampicillin/Sulbactam: R >16/8 Enterobacter, Citrobacter, and Serratia may develop resistance during prolonged therapy (3-4 days)      -  Aztreonam: S <=4      -  Cefazolin: I 4 Enterobacter, Citrobacter, and Serratia may develop resistance during prolonged therapy (3-4 days)      -  Cefepime: S <=2      -  Cefoxitin: S <=8      -  Ceftriaxone: S <=1 Enterobacter, Citrobacter, and Serratia may develop resistance during prolonged therapy      -  Ciprofloxacin: S <=1      -  Ertapenem: S <=0.5      -  Gentamicin: S <=2      -  Imipenem: S <=1      -  Levofloxacin: S <=2      -  Meropenem: S <=1      -  Piperacillin/Tazobactam: S <=8      -  Tobramycin: S <=2      -  Trimethoprim/Sulfamethoxazole: S <=2/38      Method Type: JOSE C    Culture - Abscess with Gram Stain (collected 20 Feb 2020 01:05)  Source: .Abscess Abdominal Fluid  Final Report (21 Feb 2020 18:49):    Numerous Escherichia coli    Numerous Streptococcus constellatus "Susceptibilities not performed"    Numerous Mixed Anaerobic Carmen  Organism: Escherichia coli (21 Feb 2020 18:49)  Organism: Escherichia coli (21 Feb 2020 18:49)      -  Amikacin: S <=16      -  Amoxicillin/Clavulanic Acid: I 16/8      -  Ampicillin: R >16 These ampicillin results predict results for amoxicillin      -  Ampicillin/Sulbactam: R >16/8 Enterobacter, Citrobacter, and Serratia may develop resistance during prolonged therapy (3-4 days)      -  Aztreonam: S <=4      -  Cefazolin: I 4 Enterobacter, Citrobacter, and Serratia may develop resistance during prolonged therapy (3-4 days)      -  Cefepime: S <=2      -  Cefoxitin: S <=8      -  Ceftriaxone: S <=1 Enterobacter, Citrobacter, and Serratia may develop resistance during prolonged therapy      -  Ciprofloxacin: S <=1      -  Ertapenem: S <=0.5      -  Gentamicin: S <=2      -  Imipenem: S <=1      -  Levofloxacin: S <=2      -  Meropenem: S <=1      -  Piperacillin/Tazobactam: S <=8      -  Tobramycin: S <=2      -  Trimethoprim/Sulfamethoxazole: S <=2/38      Method Type: JOSE C    Culture - Blood (collected 19 Feb 2020 16:29)  Source: .Blood Blood-Peripheral  Preliminary Report (20 Feb 2020 17:02):    No growth to date.    Culture - Blood (collected 19 Feb 2020 16:29)  Source: .Blood Blood-Peripheral  Preliminary Report (20 Feb 2020 17:02):    No growth to date.

## 2020-02-23 NOTE — DISCHARGE NOTE PROVIDER - NSDCCPCAREPLAN_GEN_ALL_CORE_FT
PRINCIPAL DISCHARGE DIAGNOSIS  Diagnosis: Abscess of abdominal cavity  Assessment and Plan of Treatment:

## 2020-02-23 NOTE — DISCHARGE NOTE PROVIDER - HOSPITAL COURSE
80yo female with pmhx of htn, dm, presented with 1 month history of abdominal pain and diagnosis of abscess.  Pt states initial pain began around thanksgiving.  Had an episode, described as RUQ pain, but went away quickly.  Had a second episode a few days later, and again went away quickly.  Pt then had another attack around [place].  States pain never went away.  Went to , who worked patient up for gallbladder disease. Per daughter, all labs at that time were normal.  Send for HIDA scan and again reported as negative.  Pt then states she found the pain moved from RUQ to right side, and then down to RLQ.  Pt states she had 30 days of amoxicillin, drain placed.  States CT scan performed just before drain removed showed resolution of abscess.  Once drain removed, patient and daughter state that patient began having abdominal pain in RLQ again and began running fevers.  PT states last fever was Saturday 2/15/20, and was 102+.  Currently afebrile.  Pt states pain is described as painful.  States it is 7-8/10 in severity most of the time.  States pain does not radiate.  States it is helped by holding her lower abdomen.  States walking also helps.  States pain is made worse with laying down, and has had increasing difficulty getting comfortable at night.  Denies urinary symptoms, hematuria, bowel changes, diarrhea, SOB, chest pain, associated back pain, difficulty breathing, lower leg/calf tenderness, N/V. In ER Elevated Wc, lactate, BCX neg. Pt started on IV abx, IR consulted, ID consulted.        Hospital course:        Patient underwent successful IR drain placement x2; ecoli positive cx. ICU consulted for rigors, ICU care not indicated at that time.          Over then next few days patient was continued on IV antibiotics, given pain control. Drain outputs monitored. Diet was advanced appropriately until return of normal GI function was obtained as evidence by passing of flatus and BM in addition to toleration of diet. Lab values were monitored with resolution of elevated Wc 2/21. Interval CT was performed 2/24 which revealed ___________________. Pt transitioned to PO abx, drains were removed and promptly d/c'd.         Disposition: Patient to follow-up with Dr. Singh as outpatient in 1 week. Pt needs outpatient colonoscopy. 78yo female with pmhx of htn, dm, presented with 1 month history of abdominal pain and diagnosis of abscess.  Pt states initial pain began around thanksgiving.  Had an episode, described as RUQ pain, but went away quickly.  Had a second episode a few days later, and again went away quickly.  Pt then had another attack around [place].  States pain never went away.  Went to , who worked patient up for gallbladder disease. Per daughter, all labs at that time were normal.  Send for HIDA scan and again reported as negative.  Pt then states she found the pain moved from RUQ to right side, and then down to RLQ.  Pt states she had 30 days of amoxicillin, drain placed.  States CT scan performed just before drain removed showed resolution of abscess.  Once drain removed, patient and daughter state that patient began having abdominal pain in RLQ again and began running fevers.  PT states last fever was Saturday 2/15/20, and was 102+.  Currently afebrile.  Pt states pain is described as painful.  States it is 7-8/10 in severity most of the time.  States pain does not radiate.  States it is helped by holding her lower abdomen.  States walking also helps.  States pain is made worse with laying down, and has had increasing difficulty getting comfortable at night.  Denies urinary symptoms, hematuria, bowel changes, diarrhea, SOB, chest pain, associated back pain, difficulty breathing, lower leg/calf tenderness, N/V. In ER Elevated Wc, lactate, BCX neg. Pt started on IV abx, IR consulted, ID consulted.        Hospital course:        Patient underwent successful IR drain placement x2; ecoli positive cx. ICU consulted for rigors, ICU care not indicated at that time.          Over then next few days patient was continued on IV antibiotics, given pain control. Drain outputs monitored. Diet was advanced appropriately until return of normal GI function was obtained as evidence by passing of flatus and BM in addition to toleration of diet. Lab values were monitored with resolution of elevated Wc 2/21. Interval CT was performed 2/24 which revealed "interval decrease in size of the dense organized collections in the right flank abdominal wall and right retroperitoneum." Pt transitioned to PO abx, drains were removed and promptly d/c'd.         Disposition: Patient to follow-up with Dr. Singh as outpatient in 1 week. Pt needs outpatient colonoscopy. 78yo female with pmhx of htn, dm, presented with 1 month history of abdominal pain and diagnosis of abscess.  Pt states initial pain began around thanksgiving.  Had an episode, described as RUQ pain, but went away quickly.  Had a second episode a few days later, and again went away quickly.  Pt then had another attack around [place].  States pain never went away.  Went to , who worked patient up for gallbladder disease. Per daughter, all labs at that time were normal.  Send for HIDA scan and again reported as negative.  Pt then states she found the pain moved from RUQ to right side, and then down to RLQ.  Pt states she had 30 days of amoxicillin, drain placed.  States CT scan performed just before drain removed showed resolution of abscess.  Once drain removed, patient and daughter state that patient began having abdominal pain in RLQ again and began running fevers.  PT states last fever was Saturday 2/15/20, and was 102+.  Currently afebrile.  Pt states pain is described as painful.  States it is 7-8/10 in severity most of the time.  States pain does not radiate.  States it is helped by holding her lower abdomen.  States walking also helps.  States pain is made worse with laying down, and has had increasing difficulty getting comfortable at night.  Denies urinary symptoms, hematuria, bowel changes, diarrhea, SOB, chest pain, associated back pain, difficulty breathing, lower leg/calf tenderness, N/V. In ER Elevated Wc, lactate, BCX neg. Pt started on IV abx, IR consulted, ID consulted.        Hospital course:        Patient underwent successful IR drain placement x2; ecoli positive cx. ICU consulted for rigors, ICU care not indicated at that time.          Over then next few days patient was continued on IV antibiotics, given pain control. Drain outputs monitored. Diet was advanced appropriately until return of normal GI function was obtained as evidence by passing of flatus and BM in addition to toleration of diet. Lab values were monitored with resolution of elevated Wc 2/21. Interval CT was performed 2/24 which revealed "interval decrease in size of the dense organized collections in the right flank abdominal wall and right retroperitoneum." Pt transitioned to PO abx, drains were removed without incident. Pt to continue oral antibiotic course x 2 weeks.        Disposition: Patient to follow-up with Dr. Singh as outpatient in 1 week. Pt needs outpatient colonoscopy when appropriate by private gastroenterologist.

## 2020-02-24 LAB
CULTURE RESULTS: SIGNIFICANT CHANGE UP
CULTURE RESULTS: SIGNIFICANT CHANGE UP
HCT VFR BLD CALC: 34.1 % — LOW (ref 34.5–45)
HGB BLD-MCNC: 11.2 G/DL — LOW (ref 11.5–15.5)
MCHC RBC-ENTMCNC: 29.4 PG — SIGNIFICANT CHANGE UP (ref 27–34)
MCHC RBC-ENTMCNC: 32.8 GM/DL — SIGNIFICANT CHANGE UP (ref 32–36)
MCV RBC AUTO: 89.5 FL — SIGNIFICANT CHANGE UP (ref 80–100)
NRBC # BLD: 0 /100 WBCS — SIGNIFICANT CHANGE UP (ref 0–0)
PLATELET # BLD AUTO: 472 K/UL — HIGH (ref 150–400)
RBC # BLD: 3.81 M/UL — SIGNIFICANT CHANGE UP (ref 3.8–5.2)
RBC # FLD: 14.4 % — SIGNIFICANT CHANGE UP (ref 10.3–14.5)
SPECIMEN SOURCE: SIGNIFICANT CHANGE UP
SPECIMEN SOURCE: SIGNIFICANT CHANGE UP
WBC # BLD: 8.14 K/UL — SIGNIFICANT CHANGE UP (ref 3.8–10.5)
WBC # FLD AUTO: 8.14 K/UL — SIGNIFICANT CHANGE UP (ref 3.8–10.5)

## 2020-02-24 PROCEDURE — 99233 SBSQ HOSP IP/OBS HIGH 50: CPT

## 2020-02-24 PROCEDURE — 74177 CT ABD & PELVIS W/CONTRAST: CPT | Mod: 26

## 2020-02-24 RX ORDER — ONDANSETRON 8 MG/1
4 TABLET, FILM COATED ORAL ONCE
Refills: 0 | Status: COMPLETED | OUTPATIENT
Start: 2020-02-24 | End: 2020-02-24

## 2020-02-24 RX ORDER — IOHEXOL 300 MG/ML
15 INJECTION, SOLUTION INTRAVENOUS
Refills: 0 | Status: COMPLETED | OUTPATIENT
Start: 2020-02-24 | End: 2020-02-24

## 2020-02-24 RX ADMIN — ONDANSETRON 4 MILLIGRAM(S): 8 TABLET, FILM COATED ORAL at 06:24

## 2020-02-24 RX ADMIN — Medication 650 MILLIGRAM(S): at 05:41

## 2020-02-24 RX ADMIN — Medication 1: at 08:49

## 2020-02-24 RX ADMIN — Medication 650 MILLIGRAM(S): at 01:15

## 2020-02-24 RX ADMIN — IOHEXOL 15 MILLILITER(S): 300 INJECTION, SOLUTION INTRAVENOUS at 10:23

## 2020-02-24 RX ADMIN — ENOXAPARIN SODIUM 40 MILLIGRAM(S): 100 INJECTION SUBCUTANEOUS at 13:57

## 2020-02-24 RX ADMIN — Medication 1 TABLET(S): at 21:37

## 2020-02-24 RX ADMIN — Medication 100 MILLIGRAM(S): at 21:37

## 2020-02-24 RX ADMIN — Medication 1 TABLET(S): at 05:37

## 2020-02-24 RX ADMIN — AMLODIPINE BESYLATE 10 MILLIGRAM(S): 2.5 TABLET ORAL at 05:37

## 2020-02-24 RX ADMIN — Medication 650 MILLIGRAM(S): at 05:40

## 2020-02-24 RX ADMIN — LOSARTAN POTASSIUM 100 MILLIGRAM(S): 100 TABLET, FILM COATED ORAL at 05:37

## 2020-02-24 RX ADMIN — IOHEXOL 15 MILLILITER(S): 300 INJECTION, SOLUTION INTRAVENOUS at 08:49

## 2020-02-24 RX ADMIN — PIPERACILLIN AND TAZOBACTAM 25 GRAM(S): 4; .5 INJECTION, POWDER, LYOPHILIZED, FOR SOLUTION INTRAVENOUS at 21:38

## 2020-02-24 RX ADMIN — PIPERACILLIN AND TAZOBACTAM 25 GRAM(S): 4; .5 INJECTION, POWDER, LYOPHILIZED, FOR SOLUTION INTRAVENOUS at 13:57

## 2020-02-24 RX ADMIN — Medication 650 MILLIGRAM(S): at 18:37

## 2020-02-24 RX ADMIN — Medication 650 MILLIGRAM(S): at 13:57

## 2020-02-24 RX ADMIN — Medication 200 MILLIGRAM(S): at 05:37

## 2020-02-24 RX ADMIN — Medication 1 TABLET(S): at 13:57

## 2020-02-24 RX ADMIN — PIPERACILLIN AND TAZOBACTAM 25 GRAM(S): 4; .5 INJECTION, POWDER, LYOPHILIZED, FOR SOLUTION INTRAVENOUS at 05:38

## 2020-02-24 NOTE — PROGRESS NOTE ADULT - SUBJECTIVE AND OBJECTIVE BOX
Patient is a 79y old  Female who presents with a chief complaint of Abdominal abscess (19 Feb 2020 18:56)    Asked to see patient for ID Consult  Interval History:Intraabd absc's. Poss ruptured AP    CENTRAL LINE:   [  ] YES       [x  ] NO  GAXIOLA:                 [  ] YES       [ x ] NO     PAST MEDICAL & SURGICAL HISTORY:  Hyperlipidemia  Diabetes  Hypertension  H/O tubal ligation    nonsmoker    REVIEW OF SYSTEMS:  All systems below were reviewed and are normal [  ]  Constitutional:  HEENT:  Lymph nodes:  ID:  Pulmonary:no cough sob  Cardiac:no CP  GI:no NVD abd pain.   Renal:  Musculoskeletal:  Neuro:  Endocrine:  Skin:  All other systems above were reviewed and are normal   [ x ]    Allergies  Allergies    Demerol (Anaphylaxis)    Intolerances          PHYSICAL EXAM:  Constitutional:  HEENT:  Lymph nodes:  Neck:  Lungs:clear  Heart:rr  Abdomen:soft nontender. multiiple drains RLQ-draining small amt purulent material  Renal:  Extremities:  Musculoskeletal:  Neurologic:  Vascular:  Endocrine:  Skin:  All of the above normal except for written comments [x  ]    LABORATORY:  CBC Full  -  ( 24 Feb 2020 06:35 )  WBC Count : 8.14 K/uL  RBC Count : 3.81 M/uL  Hemoglobin : 11.2 g/dL  Hematocrit : 34.1 %  Platelet Count - Automated : 472 K/uL  Mean Cell Volume : 89.5 fl  Mean Cell Hemoglobin : 29.4 pg  Mean Cell Hemoglobin Concentration : 32.8 gm/dL  Auto Neutrophil # : x  Auto Lymphocyte # : x  Auto Monocyte # : x  Auto Eosinophil # : x  Auto Basophil # : x  Auto Neutrophil % : x  Auto Lymphocyte % : x  Auto Monocyte % : x  Auto Eosinophil % : x  Auto Basophil % : x    02-23    143  |  107  |  7   ----------------------------<  138<H>  3.8   |  27  |  0.79    Ca    9.9      23 Feb 2020 06:05  Mg     2.2     02-23        Vanco. trough:  Genta. trough:      Radiology:< from: CT Aspiration Abscess Hematoma, Cyst (02.19.20 @ 17:26) >  EXAM:  CT ASP ABSC HEMATOMA CYST#                            PROCEDURE DATE:  02/19/2020          INTERPRETATION:    CT scan guided right flank and right intra-abdominal abscess aspiration  and drainage catheter placement :  CT abscessogram:  Clinical History:    Abdominal abscess. Patient had a drainage catheter placed in outside facility and discontinued the catheter of the follow-up CT scan and resolution of abscess.    However, patient had fever, follow-up CT showed multiple right flank collections with thick wall suggestive of abscesses in the right flank subcutaneous plane, right intra-abdominal retrocecal collections.    Image guided procedure requested by Dr. Singh .    Technique:    An informed consent obtained from the patient inthe intervention radiology suite in presence of intervention radiology nurse  . Patient placed supine on the CT scan table. Limited noncontrast CT abdomen skin markers showed collections. The skin sites marked. Time out procedure was performed. The marked site prepped and draped in sterile fashion. Under noncontrast limited CT abdomen the subcutaneous abscess was accessed after anesthetizing the tract with 1% lidocaine.  The micropuncture needle was exchanged over a microwire to a microcatheter. 5 cc of thick pus was aspirated. Fluid sent to the lab for further analysis  subsequently, microcatheter exchanged over a Bentson wire to a 8 Indonesian fascial dilator, to a 8 Indonesian multipurpose pigtail drainage catheter lies in the subcutaneous collection. Approximately 15 cc of thick pus aspirated.. The catheter anchored to the skin using StatLock device. The abscess cavity was irrigated with normal saline till the returns were clear. . Hemostasis secured by manual compression.   Following similarsteps, Limited noncontrast CT abdomen skin markers showed collections. The skin sites marked. Under noncontrast limited CT abdomen the intra-abdominal collection was accessed after anesthetizing the tract with 1% lidocaine.  The micropuncture needle was exchanged over a microwire to a microcatheter. 5 cc of thick pus was aspirated. Fluid sent to the lab for further analysis  subsequently, microcatheter exchanged over a Bentson wire to a 8 Indonesian fascial dilator, to a 8 Indonesian multipurpose pigtaildrainage catheter. Approximately 15 cc of thick pus aspirated.. The catheter anchored to the skin using StatLock device. The abscess cavity was irrigated with normal saline till the returns were clear. . Hemostasis secured by manual compression.  At this time, approximately 45 cc of nonionic contrast Omnipaque injected through the intra-abdominal abscess drainage catheter. Follow-up CT scan was done to assess the communication of abscess.    Patient tolerated the procedure well with 1% lidocaineand sedation provided by anesthesiologist . No complications noted during the procedure. Immediately after the procedure. Patient transferred to the recovery unit in stable condition for observation and management Patient's vital signs were monitoredthroughout the procedure.  Patient had chills and right cusp postprocedure in the recovery unit.. Vital signs were normal. Temperature 98.2, respiratory rate 20, oxygen saturation 97%. Blood pressure 157 x 65 mmHg. Dr. Singh informed at 5:50 PM.  Findings:    Right flank subcutaneous abscess with thick pus. Fluid sent for lab analysis. Approximately 15 cc of thick pus aspirated.    The right lower quadrant intra-abdominal abscesses show communication on contrast abscessogram. Approximately 15 ccof thick pus aspirated from this abscess as well. Abscess collection sent for lab analysis.    Impression:    Successful CT-guided right flank subcutaneous and intra-abdominal abscess drainage catheters placement. Pus sent for lab analysis into separate containers.    45 cc nonionic contrast utilized for the procedure.    Total exam .45 mGGycm2 .                REHANA SALINAS M.D., ATTENDING RADIOLOGIST  This document has been electronically signed. Feb 19 2020  6:20PM          < end of copied text >      Microbiology:    Culture - Abscess with Gram Stain (collected 20 Feb 2020 01:07)  Source: .Abscess Abdominal Fluid  Final Report (21 Feb 2020 18:52):    Moderate Escherichia coli    Rare Streptococcus constellatus "Susceptibilities not performed"    Numerous Mixed Anaerobic Carmen  Organism: Escherichia coli (21 Feb 2020 18:52)  Organism: Escherichia coli (21 Feb 2020 18:52)      -  Amikacin: S <=16      -  Amoxicillin/Clavulanic Acid: I 16/8      -  Ampicillin: R >16 These ampicillin results predict results for amoxicillin      -  Ampicillin/Sulbactam: R >16/8 Enterobacter, Citrobacter, and Serratia may develop resistance during prolonged therapy (3-4 days)      -  Aztreonam: S <=4      -  Cefazolin: I 4 Enterobacter, Citrobacter, and Serratia may develop resistance during prolonged therapy (3-4 days)      -  Cefepime: S <=2      -  Cefoxitin: S <=8      -  Ceftriaxone: S <=1 Enterobacter, Citrobacter, and Serratia may develop resistance during prolonged therapy      -  Ciprofloxacin: S <=1      -  Ertapenem: S <=0.5      -  Gentamicin: S <=2      -  Imipenem: S <=1      -  Levofloxacin: S <=2      -  Meropenem: S <=1      -  Piperacillin/Tazobactam: S <=8      -  Tobramycin: S <=2      -  Trimethoprim/Sulfamethoxazole: S <=2/38      Method Type: JOSE C    Culture - Abscess with Gram Stain (collected 20 Feb 2020 01:05)  Source: .Abscess Abdominal Fluid  Final Report (21 Feb 2020 18:49):    Numerous Escherichia coli    Numerous Streptococcus constellatus "Susceptibilities not performed"    Numerous Mixed Anaerobic Carmen  Organism: Escherichia coli (21 Feb 2020 18:49)  Organism: Escherichia coli (21 Feb 2020 18:49)      -  Amikacin: S <=16      -  Amoxicillin/Clavulanic Acid: I 16/8      -  Ampicillin: R >16 These ampicillin results predict results for amoxicillin      -  Ampicillin/Sulbactam: R >16/8 Enterobacter, Citrobacter, and Serratia may develop resistance during prolonged therapy (3-4 days)      -  Aztreonam: S <=4      -  Cefazolin: I 4 Enterobacter, Citrobacter, and Serratia may develop resistance during prolonged therapy (3-4 days)      -  Cefepime: S <=2      -  Cefoxitin: S <=8      -  Ceftriaxone: S <=1 Enterobacter, Citrobacter, and Serratia may develop resistance during prolonged therapy      -  Ciprofloxacin: S <=1      -  Ertapenem: S <=0.5      -  Gentamicin: S <=2      -  Imipenem: S <=1      -  Levofloxacin: S <=2      -  Meropenem: S <=1      -  Piperacillin/Tazobactam: S <=8      -  Tobramycin: S <=2      -  Trimethoprim/Sulfamethoxazole: S <=2/38      Method Type: JOSE C    Culture - Blood (collected 19 Feb 2020 16:29)  Source: .Blood Blood-Peripheral  Preliminary Report (20 Feb 2020 17:02):    No growth to date.    Culture - Blood (collected 19 Feb 2020 16:29)  Source: .Blood Blood-Peripheral  Preliminary Report (20 Feb 2020 17:02):    No growth to date.

## 2020-02-24 NOTE — PROGRESS NOTE ADULT - SUBJECTIVE AND OBJECTIVE BOX
STATUS POST:  IR drainage of abdominal abscesses    POST PROCEDURE DAY #: 5    SUBJECTIVE:  Patient seen and examined at bedside.  No overnight events. Patient with no new complaints at this time, tolerating diet, admits to flatus and more formed bowel movement. +/- nausea she attributes to IV abx. Patient denies any fevers, chills, chest pain, shortness of breath, abdominal pain, vomiting or diarrhea.    Vital Signs Last 24 Hrs  T(C): 36.7 (24 Feb 2020 01:00), Max: 36.7 (23 Feb 2020 15:47)  T(F): 98.1 (24 Feb 2020 01:00), Max: 98.1 (24 Feb 2020 01:00)  HR: 74 (24 Feb 2020 05:36) (68 - 74)  BP: 147/72 (24 Feb 2020 05:36) (110/69 - 151/71)  BP(mean): --  RR: 17 (24 Feb 2020 01:00) (16 - 17)  SpO2: 96% (24 Feb 2020 01:00) (95% - 98%)    PHYSICAL EXAM:  GENERAL: No acute distress, well-developed  HEAD:  Atraumatic, Normocephalic  ABDOMEN: Soft, minimally tender over drain sites, non-distended; bowel sounds+  NEUROLOGY: A&O x 3, no focal deficits    I&O's Summary    22 Feb 2020 07:01  -  23 Feb 2020 07:00  --------------------------------------------------------  IN: 1095 mL / OUT: 477 mL / NET: 618 mL    23 Feb 2020 07:01  -  24 Feb 2020 06:10  --------------------------------------------------------  IN: 0 mL / OUT: 10 mL / NET: -10 mL      I&O's Detail    22 Feb 2020 07:01  -  23 Feb 2020 07:00  --------------------------------------------------------  IN:    Oral Fluid: 1080 mL    Other: 5 mL    Other: 10 mL  Total IN: 1095 mL    OUT:    Other: 25 mL    Voided: 452 mL  Total OUT: 477 mL    Total NET: 618 mL      23 Feb 2020 07:01  -  24 Feb 2020 06:10  --------------------------------------------------------  IN:  Total IN: 0 mL    OUT:    Other: 10 mL  Total OUT: 10 mL    Total NET: -10 mL        MEDICATIONS  (STANDING):  acetaminophen   Tablet .. 650 milliGRAM(s) Oral every 6 hours  amLODIPine   Tablet 10 milliGRAM(s) Oral daily  dextrose 5%. 1000 milliLiter(s) (50 mL/Hr) IV Continuous <Continuous>  dextrose 50% Injectable 12.5 Gram(s) IV Push once  dextrose 50% Injectable 25 Gram(s) IV Push once  dextrose 50% Injectable 25 Gram(s) IV Push once  enoxaparin Injectable 40 milliGRAM(s) SubCutaneous daily  insulin lispro (HumaLOG) corrective regimen sliding scale   SubCutaneous every 6 hours  lactobacillus acidophilus 1 Tablet(s) Oral three times a day  losartan 100 milliGRAM(s) Oral daily  metoprolol succinate  milliGRAM(s) Oral at bedtime  metoprolol succinate  milliGRAM(s) Oral daily  ondansetron    Tablet 4 milliGRAM(s) Oral once  piperacillin/tazobactam IVPB.. 3.375 Gram(s) IV Intermittent every 8 hours    MEDICATIONS  (PRN):  dextrose 40% Gel 15 Gram(s) Oral once PRN Blood Glucose LESS THAN 70 milliGRAM(s)/deciliter  glucagon  Injectable 1 milliGRAM(s) IntraMuscular once PRN Glucose LESS THAN 70 milligrams/deciliter  ibuprofen  Tablet. 600 milliGRAM(s) Oral every 6 hours PRN Moderate Pain (4 - 6), Severe Pain (7 - 10)    LABS:                        12.1   10.51 )-----------( 515      ( 23 Feb 2020 06:05 )             37.2     02-23    143  |  107  |  7   ----------------------------<  138<H>  3.8   |  27  |  0.79    Ca    9.9      23 Feb 2020 06:05  Mg     2.2     02-23    RADIOLOGY & ADDITIONAL STUDIES:  CT pending    ASSESSMENT    79y Female hospital day 5 with abdominal abscess s/p IR drainage 2/19/20. Tolerating REG diet.       PLAN  - pain control, supportive care  - Repeat CT planned for today  - Transition to PO abx after imaging Monday  - Depending on CT results, possible d/c planning today  - OOB, ambulate as tolerated  - REG diet after repeat CT  - Monitor drain outputs, flush regularly, minimal outputs for either  - Team will discuss with Dr. Singh today    Surgical Team Contact Information  Spectralink: Ext: [phone] or [phone]  Pager: [phone]

## 2020-02-24 NOTE — PROGRESS NOTE ADULT - ATTENDING COMMENTS
Pt seen this Am. CT reviewed and discussed with IR. Subcutaneous drain has no output and will be removed. The intraabdominal drain will be left in place and its output will be monitored. IR feels the intrabdominal abscess is mostly phlegmon now.
Pt. seen and examined. Discussed plan and progress with the pt and surgical team.
All as above.  Patient personally seen and examined.  Will transition to PO antibiotics with recommendations from ID consultant once taking regular diet.  Interval CT Monday.  Patient pleased, family agrees, they are able to verbalize plan as outlined above.
All as above.  Patient personally seen and examined.  Will transition to PO antibiotics with recommendations from ID consultant once taking regular diet.  Interval CT Monday.  Patient pleased, family agrees, they are able to verbalize plan as outlined above.

## 2020-02-24 NOTE — CHART NOTE - NSCHARTNOTEFT_GEN_A_CORE
Patient seen for removal of subcutaneous pigtail drain, output 0cc/24hrs.  Repeat CT today showing interval decrease in size of the dense organized collections in the right flank abdominal wall and right retroperitoneum.  Drain removed without complication.  Drain site with mild induration, no erythema, purulent drainage or foul smell.  Dressed with 4x4 gauze and tegaderms.  Patient tolerated well.

## 2020-02-25 RX ADMIN — LOSARTAN POTASSIUM 100 MILLIGRAM(S): 100 TABLET, FILM COATED ORAL at 05:50

## 2020-02-25 RX ADMIN — Medication 650 MILLIGRAM(S): at 05:51

## 2020-02-25 RX ADMIN — PIPERACILLIN AND TAZOBACTAM 25 GRAM(S): 4; .5 INJECTION, POWDER, LYOPHILIZED, FOR SOLUTION INTRAVENOUS at 21:42

## 2020-02-25 RX ADMIN — AMLODIPINE BESYLATE 10 MILLIGRAM(S): 2.5 TABLET ORAL at 05:50

## 2020-02-25 RX ADMIN — Medication 1 TABLET(S): at 05:50

## 2020-02-25 RX ADMIN — Medication 650 MILLIGRAM(S): at 17:12

## 2020-02-25 RX ADMIN — Medication 650 MILLIGRAM(S): at 18:12

## 2020-02-25 RX ADMIN — Medication 1 TABLET(S): at 13:04

## 2020-02-25 RX ADMIN — PIPERACILLIN AND TAZOBACTAM 25 GRAM(S): 4; .5 INJECTION, POWDER, LYOPHILIZED, FOR SOLUTION INTRAVENOUS at 05:51

## 2020-02-25 RX ADMIN — ENOXAPARIN SODIUM 40 MILLIGRAM(S): 100 INJECTION SUBCUTANEOUS at 11:43

## 2020-02-25 RX ADMIN — Medication 100 MILLIGRAM(S): at 21:42

## 2020-02-25 RX ADMIN — PIPERACILLIN AND TAZOBACTAM 25 GRAM(S): 4; .5 INJECTION, POWDER, LYOPHILIZED, FOR SOLUTION INTRAVENOUS at 13:04

## 2020-02-25 RX ADMIN — Medication 200 MILLIGRAM(S): at 05:50

## 2020-02-25 RX ADMIN — Medication 1 TABLET(S): at 21:42

## 2020-02-25 NOTE — PHARMACOTHERAPY INTERVENTION NOTE - COMMENTS
79 y.o F, A@O, understands all the meds from the current medlist for indications and side effects. Patient is diabetic on Glucophage at home. Reviewed patient diabetic chart

## 2020-02-25 NOTE — PROGRESS NOTE ADULT - SUBJECTIVE AND OBJECTIVE BOX
Patient is a 79y old  Female who presents with a chief complaint of Abdominal abscess (19 Feb 2020 18:56)    Asked to see patient for ID Consult  Interval History:Intraabd absc's. Poss ruptured AP    CENTRAL LINE:   [  ] YES       [x  ] NO  GAXIOLA:                 [  ] YES       [ x ] NO     PAST MEDICAL & SURGICAL HISTORY:  Hyperlipidemia  Diabetes  Hypertension  H/O tubal ligation    nonsmoker    REVIEW OF SYSTEMS:  All systems below were reviewed and are normal [  ]  Constitutional:  HEENT:  Lymph nodes:  ID:  Pulmonary:no cough sob  Cardiac:no CP  GI:no NVD abd pain.   Renal:  Musculoskeletal:  Neuro:  Endocrine:  Skin:  All other systems above were reviewed and are normal   [ x ]    Allergies  Allergies    Demerol (Anaphylaxis)    Intolerances  MEDICATIONS  (STANDING):  acetaminophen   Tablet .. 650 milliGRAM(s) Oral every 6 hours  amLODIPine   Tablet 10 milliGRAM(s) Oral daily  dextrose 5%. 1000 milliLiter(s) (50 mL/Hr) IV Continuous <Continuous>  dextrose 50% Injectable 12.5 Gram(s) IV Push once  dextrose 50% Injectable 25 Gram(s) IV Push once  dextrose 50% Injectable 25 Gram(s) IV Push once  enoxaparin Injectable 40 milliGRAM(s) SubCutaneous daily  insulin lispro (HumaLOG) corrective regimen sliding scale   SubCutaneous every 6 hours  lactobacillus acidophilus 1 Tablet(s) Oral three times a day  losartan 100 milliGRAM(s) Oral daily  metoprolol succinate  milliGRAM(s) Oral at bedtime  metoprolol succinate  milliGRAM(s) Oral daily  piperacillin/tazobactam IVPB.. 3.375 Gram(s) IV Intermittent every 8 hours    MEDICATIONS  (PRN):  dextrose 40% Gel 15 Gram(s) Oral once PRN Blood Glucose LESS THAN 70 milliGRAM(s)/deciliter  glucagon  Injectable 1 milliGRAM(s) IntraMuscular once PRN Glucose LESS THAN 70 milligrams/deciliter  ibuprofen  Tablet. 600 milliGRAM(s) Oral every 6 hours PRN Moderate Pain (4 - 6), Severe Pain (7 - 10)    Vital Signs Last 24 Hrs  T(C): 36.4 (25 Feb 2020 07:18), Max: 36.7 (25 Feb 2020 05:38)  T(F): 97.5 (25 Feb 2020 07:18), Max: 98.1 (25 Feb 2020 05:38)  HR: 61 (25 Feb 2020 07:18) (61 - 76)  BP: 115/65 (25 Feb 2020 07:18) (115/65 - 150/71)  BP(mean): --  RR: 18 (25 Feb 2020 07:18) (16 - 18)  SpO2: 96% (25 Feb 2020 07:18) (95% - 96%)    I&O's Summary    24 Feb 2020 07:01  -  25 Feb 2020 07:00  --------------------------------------------------------  IN: 460 mL / OUT: 3 mL / NET: 457 mL            PHYSICAL EXAM:  Constitutional:  HEENT:  Lymph nodes:  Neck:  Lungs:clear  Heart:rr  Abdomen:soft nontender. two drains RLQ removed 2/24. one drain remains in place-draining small amt purulent material  Renal:  Extremities:  Musculoskeletal:  Neurologic:  Vascular:  Endocrine:  Skin:  All of the above normal except for written comments [x  ]    LABORATORY:  CBC Full  -  ( 24 Feb 2020 06:35 )  WBC Count : 8.14 K/uL  RBC Count : 3.81 M/uL  Hemoglobin : 11.2 g/dL  Hematocrit : 34.1 %  Platelet Count - Automated : 472 K/uL  Mean Cell Volume : 89.5 fl  Mean Cell Hemoglobin : 29.4 pg  Mean Cell Hemoglobin Concentration : 32.8 gm/dL  Auto Neutrophil # : x  Auto Lymphocyte # : x  Auto Monocyte # : x  Auto Eosinophil # : x  Auto Basophil # : x  Auto Neutrophil % : x  Auto Lymphocyte % : x  Auto Monocyte % : x  Auto Eosinophil % : x  Auto Basophil % : x          Vanco. trough:  Genta. trough:      Radiology:< from: CT Aspiration Abscess Hematoma, Cyst (02.19.20 @ 17:26) >  EXAM:  CT ASP ABSC HEMATOMA CYST#                            PROCEDURE DATE:  02/19/2020          INTERPRETATION:    CT scan guided right flank and right intra-abdominal abscess aspiration  and drainage catheter placement :  CT abscessogram:  Clinical History:    Abdominal abscess. Patient had a drainage catheter placed in outside facility and discontinued the catheter of the follow-up CT scan and resolution of abscess.    However, patient had fever, follow-up CT showed multiple right flank collections with thick wall suggestive of abscesses in the right flank subcutaneous plane, right intra-abdominal retrocecal collections.    Image guided procedure requested by Dr. Singh .    Technique:    An informed consent obtained from the patient inthe intervention radiology suite in presence of intervention radiology nurse  . Patient placed supine on the CT scan table. Limited noncontrast CT abdomen skin markers showed collections. The skin sites marked. Time out procedure was performed. The marked site prepped and draped in sterile fashion. Under noncontrast limited CT abdomen the subcutaneous abscess was accessed after anesthetizing the tract with 1% lidocaine.  The micropuncture needle was exchanged over a microwire to a microcatheter. 5 cc of thick pus was aspirated. Fluid sent to the lab for further analysis  subsequently, microcatheter exchanged over a Bentson wire to a 8 [place]ruguayan fascial dilator, to a 8 [place] multipurpose pigtail drainage catheter lies in the subcutaneous collection. Approximately 15 cc of thick pus aspirated.. The catheter anchored to the skin using StatLock device. The abscess cavity was irrigated with normal saline till the returns were clear. . Hemostasis secured by manual compression.   Following similarsteps, Limited noncontrast CT abdomen skin markers showed collections. The skin sites marked. Under noncontrast limited CT abdomen the intra-abdominal collection was accessed after anesthetizing the tract with 1% lidocaine.  The micropuncture needle was exchanged over a microwire to a microcatheter. 5 cc of thick pus was aspirated. Fluid sent to the lab for further analysis  subsequently, microcatheter exchanged over a Bentson wire to a 8 [place] fascial dilator, to a 8 [place] multipurpose pigtaildrainage catheter. Approximately 15 cc of thick pus aspirated.. The catheter anchored to the skin using StatLock device. The abscess cavity was irrigated with normal saline till the returns were clear. . Hemostasis secured by manual compression.  At this time, approximately 45 cc of nonionic contrast Omnipaque injected through the intra-abdominal abscess drainage catheter. Follow-up CT scan was done to assess the communication of abscess.    Patient tolerated the procedure well with 1% lidocaineand sedation provided by anesthesiologist . No complications noted during the procedure. Immediately after the procedure. Patient transferred to the recovery unit in stable condition for observation and management Patient's vital signs were monitoredthroughout the procedure.  Patient had chills and right cusp postprocedure in the recovery unit.. Vital signs were normal. Temperature 98.2, respiratory rate 20, oxygen saturation 97%. Blood pressure 157 x 65 mmHg. Dr. Singh informed at 5:50 PM.  Findings:    Right flank subcutaneous abscess with thick pus. Fluid sent for lab analysis. Approximately 15 cc of thick pus aspirated.    The right lower quadrant intra-abdominal abscesses show communication on contrast abscessogram. Approximately 15 ccof thick pus aspirated from this abscess as well. Abscess collection sent for lab analysis.    Impression:    Successful CT-guided right flank subcutaneous and intra-abdominal abscess drainage catheters placement. Pus sent for lab analysis into separate containers.    45 cc nonionic contrast utilized for the procedure.    Total exam .45 mGGycm2 .                REHANA SALINAS M.D., ATTENDING RADIOLOGIST  This document has been electronically signed. Feb 19 2020  6:20PM          < end of copied text >  < from: CT Abdomen and Pelvis w/ Oral Cont and w/ IV Cont (02.24.20 @ 11:26) >  EXAM:  CT ABDOMEN AND PELVIS OC IC                            PROCEDURE DATE:  02/24/2020          INTERPRETATION:  Indication: Follow-up of abscess collections.    CT abdomen and pelvis oral and IV contrast. Prior dated 2/19/2020.  90 cc [id number] injected intravenously.      2 right drainage catheters are identified. One in the right flank lateral wall at the level of the iliac crest. The dense organized fluid collection in the right lateral abdominal wall is decreased in size currently measures 3.4 cm previously measured approximately 5 cm. No retroperitoneal dense organized fluid collection is also decreased in size now measures 5 cm previously measured approximately 7.2 cm. There are no associated gas bubbles. No new fluid collections are in evidence. No unusual gas collections. Remainder study is unchanged.    Impression: Status post drainage of the right lower quadrant fluid collections. Interval decrease in size of the dense organized collections in the right flank abdominalwall and right retroperitoneum.                ESTHELA OVERTON M.D., ATTENDING RADIOLOGIST  This document has been electronically signed. Feb 24 2020 11:48AM              < end of copied text >      Microbiology:  no new cx

## 2020-02-25 NOTE — PROGRESS NOTE ADULT - SUBJECTIVE AND OBJECTIVE BOX
SUBJECTIVE:  Patient seen and examined at bedside.  No overnight events.  Patient with no new complaints at this time, feels well, tolerating diet.  Denies fever, chest pain, shortness of breath, nausea, or vomiting.    Vital Signs Last 24 Hrs  T(C): 36.4 (25 Feb 2020 07:18), Max: 36.7 (25 Feb 2020 05:38)  T(F): 97.5 (25 Feb 2020 07:18), Max: 98.1 (25 Feb 2020 05:38)  HR: 61 (25 Feb 2020 07:18) (61 - 76)  BP: 115/65 (25 Feb 2020 07:18) (115/65 - 150/71)  RR: 18 (25 Feb 2020 07:18) (16 - 18)  SpO2: 96% (25 Feb 2020 07:18) (95% - 96%)    PHYSICAL EXAM  GENERAL:  Well-nourished, well-developed female lying comfortably in bed in NAD  HEAD:  Normocephalic, atraumatic  CARDIO:  Regular rate and rhythm.  No murmur, gallop or rub appreciated.  RESPIRATORY:  Clear to auscultation bilaterally.  No wheezing, rales or rhonchi appreciated.  ABDOMEN:  Soft, nondistended, nontender; Intra-abdominal drain in place on right side of abdomen with empty bag.  Dressing over previous drain site clean and dry.  No skin erythema or discharge around drain site.  No rebound tenderness or guarding.  NEURO:  A&O x 3    I&O's Summary    24 Feb 2020 07:01  -  25 Feb 2020 07:00  --------------------------------------------------------  IN: 460 mL / OUT: 3 mL / NET: 457 mL    I&O's Detail    24 Feb 2020 07:01  -  25 Feb 2020 07:00  --------------------------------------------------------  IN:    Oral Fluid: 460 mL  Total IN: 460 mL    OUT:    Other: 3 mL  Total OUT: 3 mL    Total NET: 457 mL    MEDICATIONS  (STANDING):  acetaminophen   Tablet .. 650 milliGRAM(s) Oral every 6 hours  amLODIPine   Tablet 10 milliGRAM(s) Oral daily  dextrose 5%. 1000 milliLiter(s) (50 mL/Hr) IV Continuous <Continuous>  dextrose 50% Injectable 12.5 Gram(s) IV Push once  dextrose 50% Injectable 25 Gram(s) IV Push once  dextrose 50% Injectable 25 Gram(s) IV Push once  enoxaparin Injectable 40 milliGRAM(s) SubCutaneous daily  insulin lispro (HumaLOG) corrective regimen sliding scale   SubCutaneous every 6 hours  lactobacillus acidophilus 1 Tablet(s) Oral three times a day  losartan 100 milliGRAM(s) Oral daily  metoprolol succinate  milliGRAM(s) Oral at bedtime  metoprolol succinate  milliGRAM(s) Oral daily  piperacillin/tazobactam IVPB.. 3.375 Gram(s) IV Intermittent every 8 hours    MEDICATIONS  (PRN):  dextrose 40% Gel 15 Gram(s) Oral once PRN Blood Glucose LESS THAN 70 milliGRAM(s)/deciliter  glucagon  Injectable 1 milliGRAM(s) IntraMuscular once PRN Glucose LESS THAN 70 milligrams/deciliter  ibuprofen  Tablet. 600 milliGRAM(s) Oral every 6 hours PRN Moderate Pain (4 - 6), Severe Pain (7 - 10)    LABS:                        11.2   8.14  )-----------( 472      ( 24 Feb 2020 06:35 )             34.1     ASSESSMENT & PLAN  79 year old female s/p IR drain insertion x2 (subcutaneous/intra-abdominal) for abdominal abscess.  SQ drain removed yesterday.  IA drain with 0cc/24hrs.    - Continue intra-abdominal drain x 1 more day.  If still no output tomorrow, will likely pull it  - Continue zosyn.  Will transition to PO abx per ID recommendations prior to d/c  - Continue DASH diet  - DVT prophylaxis with lovenox  - OOB, pain control  - Case discussed with Dr. Singh SUBJECTIVE:  Patient seen and examined at bedside.  No overnight events.  Patient with no new complaints at this time, feels well, tolerating diet.  Denies fever, chest pain, shortness of breath, nausea, or vomiting.    Vital Signs Last 24 Hrs  T(C): 36.4 (25 Feb 2020 07:18), Max: 36.7 (25 Feb 2020 05:38)  T(F): 97.5 (25 Feb 2020 07:18), Max: 98.1 (25 Feb 2020 05:38)  HR: 61 (25 Feb 2020 07:18) (61 - 76)  BP: 115/65 (25 Feb 2020 07:18) (115/65 - 150/71)  RR: 18 (25 Feb 2020 07:18) (16 - 18)  SpO2: 96% (25 Feb 2020 07:18) (95% - 96%)    PHYSICAL EXAM  GENERAL:  Well-nourished, well-developed female lying comfortably in bed in NAD  HEAD:  Normocephalic, atraumatic  CARDIO:  Regular rate and rhythm.  No murmur, gallop or rub appreciated.  RESPIRATORY:  Clear to auscultation bilaterally.  No wheezing, rales or rhonchi appreciated.  ABDOMEN:  Soft, nondistended, nontender; Intra-abdominal drain in place on right side of abdomen with empty bag.  Dressing over previous drain site clean and dry.  No skin erythema or discharge around drain site.  No rebound tenderness or guarding.  NEURO:  A&O x 3    I&O's Summary    24 Feb 2020 07:01  -  25 Feb 2020 07:00  --------------------------------------------------------  IN: 460 mL / OUT: 3 mL / NET: 457 mL    I&O's Detail    24 Feb 2020 07:01  -  25 Feb 2020 07:00  --------------------------------------------------------  IN:    Oral Fluid: 460 mL  Total IN: 460 mL    OUT:    Other: 3 mL  Total OUT: 3 mL    Total NET: 457 mL    MEDICATIONS  (STANDING):  acetaminophen   Tablet .. 650 milliGRAM(s) Oral every 6 hours  amLODIPine   Tablet 10 milliGRAM(s) Oral daily  dextrose 5%. 1000 milliLiter(s) (50 mL/Hr) IV Continuous <Continuous>  dextrose 50% Injectable 12.5 Gram(s) IV Push once  dextrose 50% Injectable 25 Gram(s) IV Push once  dextrose 50% Injectable 25 Gram(s) IV Push once  enoxaparin Injectable 40 milliGRAM(s) SubCutaneous daily  insulin lispro (HumaLOG) corrective regimen sliding scale   SubCutaneous every 6 hours  lactobacillus acidophilus 1 Tablet(s) Oral three times a day  losartan 100 milliGRAM(s) Oral daily  metoprolol succinate  milliGRAM(s) Oral at bedtime  metoprolol succinate  milliGRAM(s) Oral daily  piperacillin/tazobactam IVPB.. 3.375 Gram(s) IV Intermittent every 8 hours    MEDICATIONS  (PRN):  dextrose 40% Gel 15 Gram(s) Oral once PRN Blood Glucose LESS THAN 70 milliGRAM(s)/deciliter  glucagon  Injectable 1 milliGRAM(s) IntraMuscular once PRN Glucose LESS THAN 70 milligrams/deciliter  ibuprofen  Tablet. 600 milliGRAM(s) Oral every 6 hours PRN Moderate Pain (4 - 6), Severe Pain (7 - 10)    LABS:                        11.2   8.14  )-----------( 472      ( 24 Feb 2020 06:35 )             34.1     ASSESSMENT & PLAN  79 year old female s/p IR drain insertion x2 (subcutaneous/intra-abdominal) for abdominal abscess.  SQ drain removed yesterday.  IA drain with 0cc/24hrs.    - Continue intra-abdominal drain x 1 more day.  If still no output tomorrow, will likely pull it  - Continue zosyn.  Will transition to PO augmentin/flagyl per ID recommendations prior to d/c  - Continue DASH diet  - DVT prophylaxis with lovenox  - OOB, pain control  - Case discussed with Dr. Singh

## 2020-02-26 ENCOUNTER — TRANSCRIPTION ENCOUNTER (OUTPATIENT)
Age: 80
End: 2020-02-26

## 2020-02-26 VITALS
SYSTOLIC BLOOD PRESSURE: 135 MMHG | TEMPERATURE: 98 F | HEART RATE: 66 BPM | OXYGEN SATURATION: 98 % | RESPIRATION RATE: 18 BRPM | DIASTOLIC BLOOD PRESSURE: 65 MMHG

## 2020-02-26 LAB — HBA1C BLD-MCNC: 6.7 % — HIGH (ref 4–5.6)

## 2020-02-26 PROCEDURE — 85027 COMPLETE CBC AUTOMATED: CPT

## 2020-02-26 PROCEDURE — 87186 SC STD MICRODIL/AGAR DIL: CPT

## 2020-02-26 PROCEDURE — C1769: CPT

## 2020-02-26 PROCEDURE — 77012 CT SCAN FOR NEEDLE BIOPSY: CPT

## 2020-02-26 PROCEDURE — 80053 COMPREHEN METABOLIC PANEL: CPT

## 2020-02-26 PROCEDURE — 87070 CULTURE OTHR SPECIMN AEROBIC: CPT

## 2020-02-26 PROCEDURE — 85730 THROMBOPLASTIN TIME PARTIAL: CPT

## 2020-02-26 PROCEDURE — 80048 BASIC METABOLIC PNL TOTAL CA: CPT

## 2020-02-26 PROCEDURE — 83735 ASSAY OF MAGNESIUM: CPT

## 2020-02-26 PROCEDURE — 83036 HEMOGLOBIN GLYCOSYLATED A1C: CPT

## 2020-02-26 PROCEDURE — 99285 EMERGENCY DEPT VISIT HI MDM: CPT | Mod: 25

## 2020-02-26 PROCEDURE — 74177 CT ABD & PELVIS W/CONTRAST: CPT

## 2020-02-26 PROCEDURE — 99238 HOSP IP/OBS DSCHRG MGMT 30/<: CPT

## 2020-02-26 PROCEDURE — 87640 STAPH A DNA AMP PROBE: CPT

## 2020-02-26 PROCEDURE — 87641 MR-STAPH DNA AMP PROBE: CPT

## 2020-02-26 PROCEDURE — 10160 PNXR ASPIR ABSC HMTMA BULLA: CPT

## 2020-02-26 PROCEDURE — 85610 PROTHROMBIN TIME: CPT

## 2020-02-26 PROCEDURE — 83605 ASSAY OF LACTIC ACID: CPT

## 2020-02-26 PROCEDURE — 87205 SMEAR GRAM STAIN: CPT

## 2020-02-26 PROCEDURE — 36415 COLL VENOUS BLD VENIPUNCTURE: CPT

## 2020-02-26 PROCEDURE — C1894: CPT

## 2020-02-26 PROCEDURE — 82962 GLUCOSE BLOOD TEST: CPT

## 2020-02-26 PROCEDURE — 87075 CULTR BACTERIA EXCEPT BLOOD: CPT

## 2020-02-26 PROCEDURE — 87040 BLOOD CULTURE FOR BACTERIA: CPT

## 2020-02-26 RX ORDER — METRONIDAZOLE 500 MG
1 TABLET ORAL
Qty: 42 | Refills: 0
Start: 2020-02-26 | End: 2020-03-10

## 2020-02-26 RX ORDER — METRONIDAZOLE 500 MG
500 TABLET ORAL EVERY 8 HOURS
Refills: 0 | Status: DISCONTINUED | OUTPATIENT
Start: 2020-02-26 | End: 2020-02-26

## 2020-02-26 RX ORDER — METRONIDAZOLE 500 MG
1 TABLET ORAL
Qty: 0 | Refills: 0 | DISCHARGE
Start: 2020-02-26

## 2020-02-26 RX ADMIN — Medication 200 MILLIGRAM(S): at 05:57

## 2020-02-26 RX ADMIN — PIPERACILLIN AND TAZOBACTAM 25 GRAM(S): 4; .5 INJECTION, POWDER, LYOPHILIZED, FOR SOLUTION INTRAVENOUS at 05:56

## 2020-02-26 RX ADMIN — AMLODIPINE BESYLATE 10 MILLIGRAM(S): 2.5 TABLET ORAL at 05:56

## 2020-02-26 RX ADMIN — Medication 600 MILLIGRAM(S): at 05:57

## 2020-02-26 RX ADMIN — Medication 1: at 08:03

## 2020-02-26 RX ADMIN — Medication 1 TABLET(S): at 05:56

## 2020-02-26 RX ADMIN — Medication 600 MILLIGRAM(S): at 06:30

## 2020-02-26 RX ADMIN — LOSARTAN POTASSIUM 100 MILLIGRAM(S): 100 TABLET, FILM COATED ORAL at 05:56

## 2020-02-26 NOTE — PROGRESS NOTE ADULT - ASSESSMENT
QING zosyn (8). add augmentin(8/1) and po flagyl (8/1) x 6 days more. side effects discussed with pt..Discharge per dr CLEANING
cont zosyn (2). await cx's
cont zosyn (3). await final cx's
cont zosyn (4). for CT A/P 2/24. pending results, consider DC drains and switch to po ATBs
cont zosyn (5). for CT A/P 2/24. pending results, consider DC drains and switch to po ATBs
cont zosyn (6). for CT A/P. pending results, consider DC drains and switch to po ATBs
cont zosyn (7). consider change to po augmentin/flagyl 2/26
All as above by PA note.  Patient personally seen and examined.  Taking regular diet.  Interval CT tomorro.  SQ drain without much production.  Intrabdominal drain with 25 cc.  No objection to maintaining on IV antibiotics until interval imaging complete.  Patient updated in detail as regards to plan,  she remains pleased, she and her son agree and are able to verbalize plan as outlined above.
All as above.  Patient personally seen and examined.  Now taking regular diet.  Interval CT Monday.  SQ drain without much production.  Intra-abdominal drain still functional/ draining.  No objection to maintaining on IV antibiotics until interval imaging complete.  Patient updated in detail and remains pleased, son agrees and they are able to verbalize plan as outlined above.

## 2020-02-26 NOTE — PROGRESS NOTE ADULT - REASON FOR ADMISSION
Abdominal abscess

## 2020-02-26 NOTE — DISCHARGE NOTE NURSING/CASE MANAGEMENT/SOCIAL WORK - NSDCFUADDAPPT_GEN_ALL_CORE_FT
Follow up with Dr. Singh in his office in one week. Please call for follow-up appointment.    May call for outpatient colonoscopy with Dr. Shipman' group if you do not currently have a GI doctor.

## 2020-02-26 NOTE — PROGRESS NOTE ADULT - NSHPATTENDINGPLANDISCUSS_GEN_ALL_CORE
dr BARRY
patient, family at bedside and Surgical PA.
patient in detail, son at bedside, Surgical PA and today's RN.

## 2020-02-26 NOTE — CHART NOTE - NSCHARTNOTEFT_GEN_A_CORE
Assessment: 80 y/o female admitted with abdominal abscess status post drainage drain placement and PMH of HTN and T2DM.    Pt reports eating better now and consume around 75% of meals. Pt reports having nausea previously but has resolved. Pt reports currently experiencing "a little diarrhea" and last BM was this morning (2/26). Spoke with pt family member who requested dietary education on diabetes. Provided brief education on carbohydrate serving sizes, pairing protein with carbohydrates, MyPlate, and weight loss.     Factors impacting intake [x] diarrhea      Diet Presciption: Diet, Consistent Carbohydrate/No Snacks:   DASH/TLC {Sodium & Cholesterol Restricted} (02-22-20 @ 08:07)    Intake: Pt reports consuming around 75% of meals    Current Weight: Weight (kg): 72.1 (02-19 @ 16:41)  % Weight Change    Pertinent Medications: MEDICATIONS  (STANDING):  amLODIPine   Tablet 10 milliGRAM(s) Oral daily  amoxicillin  875 milliGRAM(s)/clavulanate 1 Tablet(s) Oral two times a day  dextrose 5%. 1000 milliLiter(s) (50 mL/Hr) IV Continuous <Continuous>  dextrose 50% Injectable 12.5 Gram(s) IV Push once  dextrose 50% Injectable 25 Gram(s) IV Push once  dextrose 50% Injectable 25 Gram(s) IV Push once  enoxaparin Injectable 40 milliGRAM(s) SubCutaneous daily  insulin lispro (HumaLOG) corrective regimen sliding scale   SubCutaneous every 6 hours  lactobacillus acidophilus 1 Tablet(s) Oral three times a day  losartan 100 milliGRAM(s) Oral daily  metoprolol succinate  milliGRAM(s) Oral at bedtime  metoprolol succinate  milliGRAM(s) Oral daily  metroNIDAZOLE    Tablet 500 milliGRAM(s) Oral every 8 hours    MEDICATIONS  (PRN):  dextrose 40% Gel 15 Gram(s) Oral once PRN Blood Glucose LESS THAN 70 milliGRAM(s)/deciliter  glucagon  Injectable 1 milliGRAM(s) IntraMuscular once PRN Glucose LESS THAN 70 milligrams/deciliter  ibuprofen  Tablet. 600 milliGRAM(s) Oral every 6 hours PRN Moderate Pain (4 - 6), Severe Pain (7 - 10)    Pertinent Labs:  02-20 Alb 2.7 g/dL<L>     CAPILLARY BLOOD GLUCOSE      POCT Blood Glucose.: 151 mg/dL (26 Feb 2020 07:43)  POCT Blood Glucose.: 126 mg/dL (25 Feb 2020 21:05)  POCT Blood Glucose.: 120 mg/dL (25 Feb 2020 16:29)  POCT Blood Glucose.: 139 mg/dL (25 Feb 2020 11:34)    Skin: no pressure injury    Estimated Needs:   [x] no change since previous assessment  [ ] recalculated:     Previous Nutrition Diagnosis:   [x] Unintended Weight Loss     Nutrition Diagnosis is [x] ongoing  [ ] resolved [ ] not applicable     New Nutrition Diagnosis: [x] not applicable       Interventions:   Recommend  [ ] Change Diet To:  [ ] Nutrition Supplement  [ ] Nutrition Support  [x] Other: Monitor labs, weights, skin integrity, and PO intake    Monitoring and Evaluation:   [x] PO intake [ x ] Tolerance to diet prescription [ x ] weights [ x ] labs[ x ] follow up per protocol  [ ] other: Assessment: 78 y/o female admitted with abdominal abscess status post drainage and drain placement and PMH of HTN and T2DM.    Pt reports eating better now and consuming around 75% of meals. Pt reports having nausea previously but has resolved. Pt reports currently experiencing "a little diarrhea" and last BM was this morning (2/26). Spoke with pt family member who requested dietary education on diabetes. States she follows a low sodium diet at home. Provided brief verbal education on carbohydrate serving sizes, pairing protein with carbohydrates, MyPlate, and weight loss. Pt/family verbalized understanding.    Factors impacting intake [x] diarrhea      Diet Presciption: Diet, Consistent Carbohydrate/No Snacks:   DASH/TLC {Sodium & Cholesterol Restricted} (02-22-20 @ 08:07)    Intake: Pt reports consuming around 75% of meals    Current Weight: Weight (kg): 72.1 (02-19 @ 16:41)    Pertinent Medications: MEDICATIONS  (STANDING):  amLODIPine   Tablet 10 milliGRAM(s) Oral daily  amoxicillin  875 milliGRAM(s)/clavulanate 1 Tablet(s) Oral two times a day  dextrose 5%. 1000 milliLiter(s) (50 mL/Hr) IV Continuous <Continuous>  dextrose 50% Injectable 12.5 Gram(s) IV Push once  dextrose 50% Injectable 25 Gram(s) IV Push once  dextrose 50% Injectable 25 Gram(s) IV Push once  enoxaparin Injectable 40 milliGRAM(s) SubCutaneous daily  insulin lispro (HumaLOG) corrective regimen sliding scale   SubCutaneous every 6 hours  lactobacillus acidophilus 1 Tablet(s) Oral three times a day  losartan 100 milliGRAM(s) Oral daily  metoprolol succinate  milliGRAM(s) Oral at bedtime  metoprolol succinate  milliGRAM(s) Oral daily  metroNIDAZOLE    Tablet 500 milliGRAM(s) Oral every 8 hours    MEDICATIONS  (PRN):  dextrose 40% Gel 15 Gram(s) Oral once PRN Blood Glucose LESS THAN 70 milliGRAM(s)/deciliter  glucagon  Injectable 1 milliGRAM(s) IntraMuscular once PRN Glucose LESS THAN 70 milligrams/deciliter  ibuprofen  Tablet. 600 milliGRAM(s) Oral every 6 hours PRN Moderate Pain (4 - 6), Severe Pain (7 - 10)    Pertinent Labs:  02-20 Alb 2.7 g/dL<L>     CAPILLARY BLOOD GLUCOSE      POCT Blood Glucose.: 151 mg/dL (26 Feb 2020 07:43)  POCT Blood Glucose.: 126 mg/dL (25 Feb 2020 21:05)  POCT Blood Glucose.: 120 mg/dL (25 Feb 2020 16:29)  POCT Blood Glucose.: 139 mg/dL (25 Feb 2020 11:34)    Skin: no edema/pressure injuries documented.    Estimated Needs:   [x] no change since previous assessment  [ ] recalculated:     Previous Nutrition Diagnosis:   [x] Unintended Weight Loss     Nutrition Diagnosis is [x] ongoing, being addressed with PO diet    New Nutrition Diagnosis: [x] not applicable       Interventions:   Recommend  [ ] Change Diet To:  [ ] Nutrition Supplement  [ ] Nutrition Support  [x] Other:   1) Continue Consistent CHO, DASH/TLC diet.  2) Monitor for s/s of GI distress.  3) Monitor labs, weights, skin integrity, and PO intake    Monitoring and Evaluation:   [x] PO intake [ x ] Tolerance to diet prescription [ x ] weights [ x ] labs[ x ] follow up per protocol  [ ] other:

## 2020-02-26 NOTE — PROGRESS NOTE ADULT - SUBJECTIVE AND OBJECTIVE BOX
SUBJECTIVE:  78 y/o F seen and examined at bedside. No overnight events. Patient with no new complaints at this time, states she is feeling well. She is tolerating diet. Patient denies any fevers, chills, chest pain, shortness of breath, abdominal pain, nausea, vomiting.    Vital Signs Last 24 Hrs  T(C): 36.4 (26 Feb 2020 04:58), Max: 36.7 (25 Feb 2020 05:38)  T(F): 97.5 (26 Feb 2020 04:58), Max: 98.1 (25 Feb 2020 05:38)  HR: 69 (26 Feb 2020 04:58) (61 - 77)  BP: 136/74 (26 Feb 2020 04:58) (115/65 - 146/76)  BP(mean): --  RR: 18 (26 Feb 2020 04:58) (18 - 18)  SpO2: 97% (26 Feb 2020 04:58) (95% - 97%)    PHYSICAL EXAM:  GENERAL: NAD, resting in bed comfortably  HEAD:  Atraumatic, Normocephalic  ABDOMEN: Drain site in place and dressing dry, bag empty. No sign of infection. Abdomen soft, non-tender to palpation, non-distended. +BS  NEUROLOGY: A&O x 3    I&O's Summary    24 Feb 2020 07:01  -  25 Feb 2020 07:00  --------------------------------------------------------  IN: 460 mL / OUT: 3 mL / NET: 457 mL    25 Feb 2020 07:01  -  26 Feb 2020 05:18  --------------------------------------------------------  IN: 650 mL / OUT: 0 mL / NET: 650 mL      I&O's Detail    24 Feb 2020 07:01  -  25 Feb 2020 07:00  --------------------------------------------------------  IN:    Oral Fluid: 460 mL  Total IN: 460 mL    OUT:    Other: 3 mL  Total OUT: 3 mL    Total NET: 457 mL      25 Feb 2020 07:01  -  26 Feb 2020 05:18  --------------------------------------------------------  IN:    Oral Fluid: 550 mL    Solution: 100 mL  Total IN: 650 mL    OUT:  Total OUT: 0 mL    Total NET: 650 mL        MEDICATIONS  (STANDING):  amLODIPine   Tablet 10 milliGRAM(s) Oral daily  dextrose 5%. 1000 milliLiter(s) (50 mL/Hr) IV Continuous <Continuous>  dextrose 50% Injectable 12.5 Gram(s) IV Push once  dextrose 50% Injectable 25 Gram(s) IV Push once  dextrose 50% Injectable 25 Gram(s) IV Push once  enoxaparin Injectable 40 milliGRAM(s) SubCutaneous daily  insulin lispro (HumaLOG) corrective regimen sliding scale   SubCutaneous every 6 hours  lactobacillus acidophilus 1 Tablet(s) Oral three times a day  losartan 100 milliGRAM(s) Oral daily  metoprolol succinate  milliGRAM(s) Oral at bedtime  metoprolol succinate  milliGRAM(s) Oral daily  piperacillin/tazobactam IVPB.. 3.375 Gram(s) IV Intermittent every 8 hours    MEDICATIONS  (PRN):  dextrose 40% Gel 15 Gram(s) Oral once PRN Blood Glucose LESS THAN 70 milliGRAM(s)/deciliter  glucagon  Injectable 1 milliGRAM(s) IntraMuscular once PRN Glucose LESS THAN 70 milligrams/deciliter  ibuprofen  Tablet. 600 milliGRAM(s) Oral every 6 hours PRN Moderate Pain (4 - 6), Severe Pain (7 - 10)    LABS:                        11.2   8.14  )-----------( 472      ( 24 Feb 2020 06:35 )             34.1       ASSESSMENT  78 y/o F s/p IR drain insertion x2 for abdominal abscess, SQ drain removed 2/24, intra abdominal drain with output of 0cc/24hrs, pt feeling well, tolerating diet.    PLAN  -Likely d/c intra-abdominal drain today (output 0cc/24hr)  -Consider transition to PO Augmentin and PO Flagyl today per ID recs  -Continue DASH diet  -DVT prophylaxis with lovenox  -OOB, pain control  -D/C planning   -To be discussed with Dr. Singh    Surgical Team Contact Information  Spectralink: Ext: [phone] or [phone]  Pager: [phone]

## 2020-02-26 NOTE — PROGRESS NOTE ADULT - SUBJECTIVE AND OBJECTIVE BOX
Patient is a 79y old  Female who presents with a chief complaint of Abdominal abscess (19 Feb 2020 18:56)    Asked to see patient for ID Consult  Interval History:Intraabd absc's. Poss ruptured AP    CENTRAL LINE:   [  ] YES       [x  ] NO  GAXIOLA:                 [  ] YES       [ x ] NO     PAST MEDICAL & SURGICAL HISTORY:  Hyperlipidemia  Diabetes  Hypertension  H/O tubal ligation    nonsmoker    REVIEW OF SYSTEMS:  All systems below were reviewed and are normal [  ]  Constitutional:  HEENT:  Lymph nodes:  ID:  Pulmonary:no cough sob  Cardiac:no CP  GI:no NVD abd pain.   Renal:  Musculoskeletal:  Neuro:  Endocrine:  Skin:  All other systems above were reviewed and are normal   [ x ]    Allergies  Allergies    Demerol (Anaphylaxis)    Intolerances    MEDICATIONS  (STANDING):  amLODIPine   Tablet 10 milliGRAM(s) Oral daily  dextrose 5%. 1000 milliLiter(s) (50 mL/Hr) IV Continuous <Continuous>  dextrose 50% Injectable 12.5 Gram(s) IV Push once  dextrose 50% Injectable 25 Gram(s) IV Push once  dextrose 50% Injectable 25 Gram(s) IV Push once  enoxaparin Injectable 40 milliGRAM(s) SubCutaneous daily  insulin lispro (HumaLOG) corrective regimen sliding scale   SubCutaneous every 6 hours  lactobacillus acidophilus 1 Tablet(s) Oral three times a day  losartan 100 milliGRAM(s) Oral daily  metoprolol succinate  milliGRAM(s) Oral at bedtime  metoprolol succinate  milliGRAM(s) Oral daily  piperacillin/tazobactam IVPB.. 3.375 Gram(s) IV Intermittent every 8 hours    MEDICATIONS  (PRN):  dextrose 40% Gel 15 Gram(s) Oral once PRN Blood Glucose LESS THAN 70 milliGRAM(s)/deciliter  glucagon  Injectable 1 milliGRAM(s) IntraMuscular once PRN Glucose LESS THAN 70 milligrams/deciliter  ibuprofen  Tablet. 600 milliGRAM(s) Oral every 6 hours PRN Moderate Pain (4 - 6), Severe Pain (7 - 10)    Vital Signs Last 24 Hrs  T(C): 36.5 (26 Feb 2020 07:48), Max: 36.7 (26 Feb 2020 00:20)  T(F): 97.7 (26 Feb 2020 07:48), Max: 98 (26 Feb 2020 00:20)  HR: 66 (26 Feb 2020 07:48) (66 - 77)  BP: 135/65 (26 Feb 2020 07:48) (117/73 - 146/76)  BP(mean): --  RR: 18 (26 Feb 2020 07:48) (18 - 18)  SpO2: 98% (26 Feb 2020 07:48) (96% - 98%)    I&O's Summary    25 Feb 2020 07:01  -  26 Feb 2020 07:00  --------------------------------------------------------  IN: 650 mL / OUT: 0 mL / NET: 650 mL          PHYSICAL EXAM:  Constitutional:  HEENT:  Lymph nodes:  Neck:  Lungs:clear  Heart:rr  Abdomen:soft nontender. two drains RLQ removed 2/24. one drain remains in place-draining small amt purulent material  Renal:  Extremities:  Musculoskeletal:  Neurologic:  Vascular:  Endocrine:  Skin:  All of the above normal except for written comments [x  ]    LABORATORY:    Vanco. trough:  Genta. trough:      Radiology:< from: CT Aspiration Abscess Hematoma, Cyst (02.19.20 @ 17:26) >  EXAM:  CT ASP ABSC HEMATOMA CYST#                            PROCEDURE DATE:  02/19/2020          INTERPRETATION:    CT scan guided right flank and right intra-abdominal abscess aspiration  and drainage catheter placement :  CT abscessogram:  Clinical History:    Abdominal abscess. Patient had a drainage catheter placed in outside facility and discontinued the catheter of the follow-up CT scan and resolution of abscess.    However, patient had fever, follow-up CT showed multiple right flank collections with thick wall suggestive of abscesses in the right flank subcutaneous plane, right intra-abdominal retrocecal collections.    Image guided procedure requested by Dr. Singh .    Technique:    An informed consent obtained from the patient inthe intervention radiology suite in presence of intervention radiology nurse  . Patient placed supine on the CT scan table. Limited noncontrast CT abdomen skin markers showed collections. The skin sites marked. Time out procedure was performed. The marked site prepped and draped in sterile fashion. Under noncontrast limited CT abdomen the subcutaneous abscess was accessed after anesthetizing the tract with 1% lidocaine.  The micropuncture needle was exchanged over a microwire to a microcatheter. 5 cc of thick pus was aspirated. Fluid sent to the lab for further analysis  subsequently, microcatheter exchanged over a Bentson wire to a 8 Italian fascial dilator, to a 8 Italian multipurpose pigtail drainage catheter lies in the subcutaneous collection. Approximately 15 cc of thick pus aspirated.. The catheter anchored to the skin using StatLock device. The abscess cavity was irrigated with normal saline till the returns were clear. . Hemostasis secured by manual compression.   Following similarsteps, Limited noncontrast CT abdomen skin markers showed collections. The skin sites marked. Under noncontrast limited CT abdomen the intra-abdominal collection was accessed after anesthetizing the tract with 1% lidocaine.  The micropuncture needle was exchanged over a microwire to a microcatheter. 5 cc of thick pus was aspirated. Fluid sent to the lab for further analysis  subsequently, microcatheter exchanged over a Bentson wire to a 8 Italian fascial dilator, to a 8 Italian multipurpose pigtaildrainage catheter. Approximately 15 cc of thick pus aspirated.. The catheter anchored to the skin using StatLock device. The abscess cavity was irrigated with normal saline till the returns were clear. . Hemostasis secured by manual compression.  At this time, approximately 45 cc of nonionic contrast Omnipaque injected through the intra-abdominal abscess drainage catheter. Follow-up CT scan was done to assess the communication of abscess.    Patient tolerated the procedure well with 1% lidocaineand sedation provided by anesthesiologist . No complications noted during the procedure. Immediately after the procedure. Patient transferred to the recovery unit in stable condition for observation and management Patient's vital signs were monitoredthroughout the procedure.  Patient had chills and right cusp postprocedure in the recovery unit.. Vital signs were normal. Temperature 98.2, respiratory rate 20, oxygen saturation 97%. Blood pressure 157 x 65 mmHg. Dr. Singh informed at 5:50 PM.  Findings:    Right flank subcutaneous abscess with thick pus. Fluid sent for lab analysis. Approximately 15 cc of thick pus aspirated.    The right lower quadrant intra-abdominal abscesses show communication on contrast abscessogram. Approximately 15 ccof thick pus aspirated from this abscess as well. Abscess collection sent for lab analysis.    Impression:    Successful CT-guided right flank subcutaneous and intra-abdominal abscess drainage catheters placement. Pus sent for lab analysis into separate containers.    45 cc nonionic contrast utilized for the procedure.    Total exam .45 mGGycm2 .                REHANA SALINAS M.D., ATTENDING RADIOLOGIST  This document has been electronically signed. Feb 19 2020  6:20PM          < end of copied text >  < from: CT Abdomen and Pelvis w/ Oral Cont and w/ IV Cont (02.24.20 @ 11:26) >  EXAM:  CT ABDOMEN AND PELVIS OC IC                            PROCEDURE DATE:  02/24/2020          INTERPRETATION:  Indication: Follow-up of abscess collections.    CT abdomen and pelvis oral and IV contrast. Prior dated 2/19/2020.  90 cc [id number] injected intravenously.      2 right drainage catheters are identified. One in the right flank lateral wall at the level of the iliac crest. The dense organized fluid collection in the right lateral abdominal wall is decreased in size currently measures 3.4 cm previously measured approximately 5 cm. No retroperitoneal dense organized fluid collection is also decreased in size now measures 5 cm previously measured approximately 7.2 cm. There are no associated gas bubbles. No new fluid collections are in evidence. No unusual gas collections. Remainder study is unchanged.    Impression: Status post drainage of the right lower quadrant fluid collections. Interval decrease in size of the dense organized collections in the right flank abdominalwall and right retroperitoneum.                ESTHELA OVERTON M.D., ATTENDING RADIOLOGIST  This document has been electronically signed. Feb 24 2020 11:48AM              < end of copied text >      Microbiology:  no new cx

## 2020-02-26 NOTE — DISCHARGE NOTE NURSING/CASE MANAGEMENT/SOCIAL WORK - PATIENT PORTAL LINK FT
You can access the FollowMyHealth Patient Portal offered by [facility] by registering at the following website: [email or web address]. By joining [facility]’s FollowMyHealth portal, you will also be able to view your health information using other applications (apps) compatible with our system.

## 2020-02-27 PROBLEM — I10 ESSENTIAL (PRIMARY) HYPERTENSION: Chronic | Status: ACTIVE | Noted: 2020-02-19

## 2020-02-27 PROBLEM — E78.5 HYPERLIPIDEMIA, UNSPECIFIED: Chronic | Status: ACTIVE | Noted: 2020-02-19

## 2020-02-27 PROBLEM — E11.9 TYPE 2 DIABETES MELLITUS WITHOUT COMPLICATIONS: Chronic | Status: ACTIVE | Noted: 2020-02-19

## 2020-03-03 ENCOUNTER — APPOINTMENT (OUTPATIENT)
Dept: SURGERY | Facility: CLINIC | Age: 80
End: 2020-03-03
Payer: MEDICARE

## 2020-03-03 VITALS
WEIGHT: 169 LBS | HEART RATE: 80 BPM | OXYGEN SATURATION: 96 % | HEIGHT: 63 IN | DIASTOLIC BLOOD PRESSURE: 77 MMHG | SYSTOLIC BLOOD PRESSURE: 148 MMHG | TEMPERATURE: 97.9 F | BODY MASS INDEX: 29.95 KG/M2

## 2020-03-03 PROCEDURE — 99214 OFFICE O/P EST MOD 30 MIN: CPT

## 2020-03-11 ENCOUNTER — FORM ENCOUNTER (OUTPATIENT)
Age: 80
End: 2020-03-11

## 2020-03-12 ENCOUNTER — OUTPATIENT (OUTPATIENT)
Dept: OUTPATIENT SERVICES | Facility: HOSPITAL | Age: 80
LOS: 1 days | End: 2020-03-12
Payer: MEDICARE

## 2020-03-12 ENCOUNTER — APPOINTMENT (OUTPATIENT)
Dept: CT IMAGING | Facility: CLINIC | Age: 80
End: 2020-03-12
Payer: MEDICARE

## 2020-03-12 ENCOUNTER — APPOINTMENT (OUTPATIENT)
Dept: SURGERY | Facility: CLINIC | Age: 80
End: 2020-03-12

## 2020-03-12 ENCOUNTER — OUTPATIENT (OUTPATIENT)
Dept: OUTPATIENT SERVICES | Facility: HOSPITAL | Age: 80
LOS: 1 days | End: 2020-03-12

## 2020-03-12 DIAGNOSIS — R10.11 RIGHT UPPER QUADRANT PAIN: ICD-10-CM

## 2020-03-12 DIAGNOSIS — Z98.51 TUBAL LIGATION STATUS: Chronic | ICD-10-CM

## 2020-03-12 DIAGNOSIS — Z00.8 ENCOUNTER FOR OTHER GENERAL EXAMINATION: ICD-10-CM

## 2020-03-12 PROCEDURE — 74177 CT ABD & PELVIS W/CONTRAST: CPT

## 2020-03-12 PROCEDURE — 74177 CT ABD & PELVIS W/CONTRAST: CPT | Mod: 26

## 2020-04-07 ENCOUNTER — APPOINTMENT (OUTPATIENT)
Dept: SURGERY | Facility: CLINIC | Age: 80
End: 2020-04-07

## 2020-05-08 ENCOUNTER — RX RENEWAL (OUTPATIENT)
Age: 80
End: 2020-05-08

## 2020-05-29 ENCOUNTER — RX RENEWAL (OUTPATIENT)
Age: 80
End: 2020-05-29

## 2020-05-31 ENCOUNTER — RX RENEWAL (OUTPATIENT)
Age: 80
End: 2020-05-31

## 2020-06-04 ENCOUNTER — APPOINTMENT (OUTPATIENT)
Dept: SURGERY | Facility: CLINIC | Age: 80
End: 2020-06-04
Payer: MEDICARE

## 2020-06-04 VITALS
RESPIRATION RATE: 14 BRPM | SYSTOLIC BLOOD PRESSURE: 155 MMHG | BODY MASS INDEX: 28.7 KG/M2 | HEART RATE: 74 BPM | DIASTOLIC BLOOD PRESSURE: 76 MMHG | OXYGEN SATURATION: 95 % | HEIGHT: 63 IN | WEIGHT: 162 LBS

## 2020-06-04 PROCEDURE — 99214 OFFICE O/P EST MOD 30 MIN: CPT

## 2020-06-08 ENCOUNTER — NON-APPOINTMENT (OUTPATIENT)
Age: 80
End: 2020-06-08

## 2020-06-08 ENCOUNTER — APPOINTMENT (OUTPATIENT)
Dept: INTERNAL MEDICINE | Facility: CLINIC | Age: 80
End: 2020-06-08
Payer: MEDICARE

## 2020-06-08 VITALS
HEIGHT: 63 IN | BODY MASS INDEX: 29.06 KG/M2 | WEIGHT: 164 LBS | DIASTOLIC BLOOD PRESSURE: 80 MMHG | OXYGEN SATURATION: 97 % | SYSTOLIC BLOOD PRESSURE: 178 MMHG | TEMPERATURE: 97.7 F | HEART RATE: 87 BPM

## 2020-06-08 VITALS — DIASTOLIC BLOOD PRESSURE: 74 MMHG | SYSTOLIC BLOOD PRESSURE: 142 MMHG

## 2020-06-08 DIAGNOSIS — Z23 ENCOUNTER FOR IMMUNIZATION: ICD-10-CM

## 2020-06-08 DIAGNOSIS — R50.9 FEVER, UNSPECIFIED: ICD-10-CM

## 2020-06-08 DIAGNOSIS — R68.83 CHILLS (WITHOUT FEVER): ICD-10-CM

## 2020-06-08 DIAGNOSIS — Z87.898 PERSONAL HISTORY OF OTHER SPECIFIED CONDITIONS: ICD-10-CM

## 2020-06-08 PROCEDURE — 93000 ELECTROCARDIOGRAM COMPLETE: CPT

## 2020-06-08 PROCEDURE — 99214 OFFICE O/P EST MOD 30 MIN: CPT | Mod: 25

## 2020-06-08 PROCEDURE — 36415 COLL VENOUS BLD VENIPUNCTURE: CPT

## 2020-06-08 RX ORDER — METRONIDAZOLE 500 MG/1
500 TABLET ORAL
Qty: 42 | Refills: 0 | Status: COMPLETED | COMMUNITY
Start: 2020-02-26

## 2020-06-08 RX ORDER — ZOSTER VACCINE RECOMBINANT, ADJUVANTED 50 MCG/0.5
50 KIT INTRAMUSCULAR
Qty: 1 | Refills: 1 | Status: COMPLETED | COMMUNITY
Start: 2019-10-24 | End: 2020-06-08

## 2020-06-08 RX ORDER — AMOXICILLIN AND CLAVULANATE POTASSIUM 875; 125 MG/1; MG/1
875-125 TABLET, COATED ORAL
Qty: 20 | Refills: 0 | Status: COMPLETED | COMMUNITY
Start: 2020-02-15

## 2020-06-08 NOTE — PHYSICAL EXAM
[No Carotid Bruits] : no carotid bruits [No Varicosities] : no varicosities [No Edema] : there was no peripheral edema [Normal] : no rash

## 2020-06-08 NOTE — RESULTS/DATA
[] : results reviewed [de-identified] : NSR, no acute abnormalities, no changes compared to 10/2019

## 2020-06-08 NOTE — REVIEW OF SYSTEMS
[Negative] : Heme/Lymph [Fever] : no fever [Chills] : no chills [Fatigue] : no fatigue [Hot Flashes] : no hot flashes [Night Sweats] : no night sweats [Abdominal Pain] : no abdominal pain [Nausea] : no nausea [Constipation] : no constipation [Diarrhea] : diarrhea [Vomiting] : no vomiting [Heartburn] : no heartburn

## 2020-06-08 NOTE — ASSESSMENT
[Patient Optimized for Surgery] : Patient optimized for surgery [No Further Testing Recommended] : no further testing recommended [As per surgery] : as per surgery [FreeTextEntry4] : o

## 2020-06-08 NOTE — HISTORY OF PRESENT ILLNESS
[No Pertinent Cardiac History] : no history of aortic stenosis, atrial fibrillation, coronary artery disease, recent myocardial infarction, or implantable device/pacemaker [No Pertinent Pulmonary History] : no history of asthma, COPD, sleep apnea, or smoking [No Adverse Anesthesia Reaction] : no adverse anesthesia reaction in self or family member [Diabetes] : diabetes [(Patient denies any chest pain, claudication, dyspnea on exertion, orthopnea, palpitations or syncope)] : Patient denies any chest pain, claudication, dyspnea on exertion, orthopnea, palpitations or syncope [Good (7-10 METs)] : Good (7-10 METs) [Family Member] : no family member with adverse anesthesia reaction/sudden death [Self] : no previous adverse anesthesia reaction [Chronic Anticoagulation] : no chronic anticoagulation [Chronic Kidney Disease] : no chronic kidney disease [FreeTextEntry1] : Laproscopic Appendectomy  [FreeTextEntry2] : 6/17/20 [FreeTextEntry3] : Dr. Singh  [FreeTextEntry4] : 79 y/o female patient presents today:\par - Pre-operative clearance - patient will undergo elective laproscopic appendectomy on 6/17/20, due to hx of intraabdominal abscesses/appendicitis, at [facility]. Patient currently denies any abdominal pain/N/V/D/C, chest pain, SOB, palpitations, edema. \par

## 2020-06-09 LAB
ALBUMIN SERPL ELPH-MCNC: 4.9 G/DL
ALP BLD-CCNC: 78 U/L
ALT SERPL-CCNC: 20 U/L
ANION GAP SERPL CALC-SCNC: 15 MMOL/L
APPEARANCE: ABNORMAL
APTT BLD: 30.6 SEC
AST SERPL-CCNC: 19 U/L
BACTERIA: NEGATIVE
BASOPHILS # BLD AUTO: 0.05 K/UL
BASOPHILS NFR BLD AUTO: 0.8 %
BILIRUB SERPL-MCNC: 0.6 MG/DL
BILIRUBIN URINE: NEGATIVE
BLOOD URINE: NEGATIVE
BUN SERPL-MCNC: 18 MG/DL
CALCIUM OXALATE CRYSTALS: ABNORMAL
CALCIUM SERPL-MCNC: 10.2 MG/DL
CHLORIDE SERPL-SCNC: 102 MMOL/L
CO2 SERPL-SCNC: 24 MMOL/L
COLOR: YELLOW
CREAT SERPL-MCNC: 0.79 MG/DL
EOSINOPHIL # BLD AUTO: 0.1 K/UL
EOSINOPHIL NFR BLD AUTO: 1.6 %
GLUCOSE QUALITATIVE U: NEGATIVE
GLUCOSE SERPL-MCNC: 182 MG/DL
HCT VFR BLD CALC: 43.9 %
HGB BLD-MCNC: 14.3 G/DL
HYALINE CASTS: 0 /LPF
IMM GRANULOCYTES NFR BLD AUTO: 0.8 %
INR PPP: 1.08 RATIO
KETONES URINE: NEGATIVE
LEUKOCYTE ESTERASE URINE: NEGATIVE
LYMPHOCYTES # BLD AUTO: 1.41 K/UL
LYMPHOCYTES NFR BLD AUTO: 22.2 %
MAN DIFF?: NORMAL
MCHC RBC-ENTMCNC: 30.4 PG
MCHC RBC-ENTMCNC: 32.6 GM/DL
MCV RBC AUTO: 93.4 FL
MICROSCOPIC-UA: NORMAL
MONOCYTES # BLD AUTO: 0.54 K/UL
MONOCYTES NFR BLD AUTO: 8.5 %
NEUTROPHILS # BLD AUTO: 4.2 K/UL
NEUTROPHILS NFR BLD AUTO: 66.1 %
NITRITE URINE: NEGATIVE
PH URINE: 5.5
PLATELET # BLD AUTO: 248 K/UL
POTASSIUM SERPL-SCNC: 3.9 MMOL/L
PROT SERPL-MCNC: 7.7 G/DL
PROTEIN URINE: NEGATIVE
PT BLD: 12.2 SEC
RBC # BLD: 4.7 M/UL
RBC # FLD: 13.4 %
RED BLOOD CELLS URINE: 2 /HPF
SODIUM SERPL-SCNC: 141 MMOL/L
SPECIFIC GRAVITY URINE: >=1.03
SQUAMOUS EPITHELIAL CELLS: 2 /HPF
URIC ACID CRYSTALS: ABNORMAL
UROBILINOGEN URINE: NORMAL
WBC # FLD AUTO: 6.35 K/UL
WHITE BLOOD CELLS URINE: 3 /HPF

## 2020-06-10 ENCOUNTER — OUTPATIENT (OUTPATIENT)
Dept: OUTPATIENT SERVICES | Facility: HOSPITAL | Age: 80
LOS: 1 days | End: 2020-06-10
Payer: MEDICARE

## 2020-06-10 VITALS — HEIGHT: 64 IN | WEIGHT: 162.92 LBS

## 2020-06-10 DIAGNOSIS — Z98.51 TUBAL LIGATION STATUS: Chronic | ICD-10-CM

## 2020-06-10 DIAGNOSIS — K35.33 ACUTE APPENDICITIS WITH PERFORATION, LOCALIZED PERITONITIS, AND GANGRENE, WITH ABSCESS: ICD-10-CM

## 2020-06-10 DIAGNOSIS — Z01.818 ENCOUNTER FOR OTHER PREPROCEDURAL EXAMINATION: ICD-10-CM

## 2020-06-10 DIAGNOSIS — K35.30 ACUTE APPENDICITIS WITH LOCALIZED PERITONITIS, WITHOUT PERFORATION OR GANGRENE: ICD-10-CM

## 2020-06-10 PROCEDURE — G0463: CPT

## 2020-06-10 RX ORDER — ASPIRIN/CALCIUM CARB/MAGNESIUM 324 MG
1 TABLET ORAL
Qty: 0 | Refills: 0 | DISCHARGE

## 2020-06-10 NOTE — H&P PST ADULT - NSANTHOSAYNRD_GEN_A_CORE
No. SHANTI screening performed.  STOP BANG Legend: 0-2 = LOW Risk; 3-4 = INTERMEDIATE Risk; 5-8 = HIGH Risk

## 2020-06-10 NOTE — H&P PST ADULT - PRESSURE ULCER(S)
Lower abdominal pain for past week, saw urgent care and was given antibiotics for suspected bladder infection, states pain not getting any better
no

## 2020-06-10 NOTE — H&P PST ADULT - HISTORY OF PRESENT ILLNESS
78yo female with pmhx of htn, dm, here for 1 month history of abdominal pain and diagnosis of abscess.  Pt states initial pain began around thanksgiving.  Had an episode, described as RUQ pain, but went away quickly.  Had a second episode a few days later, and again went away quickly.  Pt then had another attack around [place].  States pain never went away.  Went to , who worked patient up for gallbladder disease. Per daughter, all labs at that time were normal.  Send for HIDA scan and again reported as negative.  Pt then states she found the pain moved from RUQ to right side, and then down to RLQ.  Pt states she had 30 days of amoxicillin, drain placed.  States CT scan performed just before drain removed showed resolution of abscess.  Once drain removed, patient and daughter state that patient began having abdominal pain in RLQ again and began running fevers.  PT states last fever was Saturday 2/15/20, and was 102+.  Currently afebrile.  Pt states pain is described as painful.  States it is 7-8/10 in severity most of the time.  States pain does not radiate.  States it is helped by holding her lower abdomen.  States walking also helps.  States pain is made worse with laying down, and has had increasing difficulty getting comfortable at night.  Denies urinary symptoms, hematuria, bowel changes, diarrhea, SOB, chest pain, associated back pain, difficulty breathing, lower leg/calf tenderness, N/V. 78yo female with pmhx of htn, dm, here for 5 month history of abdominal pain and diagnosis of appedicitis with perforation. Was in [facility], had a drain placed, then was removed. Developed pain again in Feb 2020, was seen in [facility] ER, another drain was placed and removed and discharged  after about 8 days.   Was on antibiotic for a month.  Due to covid surgery could not be done sooner, was seen by Dr Singh, scheduled for laparoscopic possible open appendectomy.    78 yo female interviewed over the phone due to COVID pandemic. Physical  will be completed on the day of procedure. COVID testing will be done in [facility]. 78yo female with pmhx of htn, dm, here for 5 month history of abdominal pain and diagnosis of appedicitis with perforation. Was in [facility], had a drain placed, then was removed. Developed pain again in Feb 2020, was seen in [facility] ER, another drain was placed and removed and discharged  after about 8 days.   Was on antibiotic for a month.  Due to covid surgery could not be done sooner, was seen by Dr Singh, scheduled for laparoscopic possible open appendectomy on 6/17/20.   80 yo female interviewed over the phone due to COVID pandemic. Physical  will be completed on the day of procedure. COVID testing will be done in [facility].

## 2020-06-10 NOTE — H&P PST ADULT - NSICDXPASTMEDICALHX_GEN_ALL_CORE_FT
PAST MEDICAL HISTORY:  Diabetes     Hyperlipidemia     Hypertension PAST MEDICAL HISTORY:  Appendicitis perforation jan 2020    Colon polyp     Diabetes     Hyperlipidemia     Hypertension

## 2020-06-10 NOTE — H&P PST ADULT - ASSESSMENT
80yo female  Due to covid pandemic surgery could not be done sooner, was seen by Dr schmitt, scheduled for laparoscopic possible open appendectomy on 6/17/20   80 yo female interviewed over the phone due to COVID pandemic. Physical  will be completed on the day of procedure. COVID testing will be done in [facility]. Pre-procedure instructions given and pt verbalized understanding. Medical eval , labs and EKG pending. 78yo female  Due to covid pandemic surgery could not be done sooner, was seen by Dr schmitt, scheduled for laparoscopic possible open appendectomy on 6/17/20   78 yo female interviewed over the phone due to COVID pandemic. Physical  will be completed on the day of procedure. COVID testing will be done in [facility]. Pre-procedure instructions given and pt verbalized understanding. Medical eval , labs and EKG pending.    Addendum: medical eval done by PCP. Physical completed today. Optimized to proceed.

## 2020-06-10 NOTE — H&P PST ADULT - NSICDXFAMILYHX_GEN_ALL_CORE_FT
FAMILY HISTORY:  Family hx of colon cancer, mother-  from  FH: CAD (coronary artery disease), father-  at 48 FAMILY HISTORY:  Family hx of colon cancer, mother-   FH: CAD (coronary artery disease), father-  at 48

## 2020-06-11 ENCOUNTER — APPOINTMENT (OUTPATIENT)
Dept: CT IMAGING | Facility: CLINIC | Age: 80
End: 2020-06-11
Payer: MEDICARE

## 2020-06-11 ENCOUNTER — OUTPATIENT (OUTPATIENT)
Dept: OUTPATIENT SERVICES | Facility: HOSPITAL | Age: 80
LOS: 1 days | End: 2020-06-11
Payer: MEDICARE

## 2020-06-11 DIAGNOSIS — Z98.51 TUBAL LIGATION STATUS: Chronic | ICD-10-CM

## 2020-06-11 DIAGNOSIS — Z00.8 ENCOUNTER FOR OTHER GENERAL EXAMINATION: ICD-10-CM

## 2020-06-11 PROCEDURE — 74177 CT ABD & PELVIS W/CONTRAST: CPT | Mod: 26

## 2020-06-11 PROCEDURE — 82565 ASSAY OF CREATININE: CPT

## 2020-06-11 PROCEDURE — 74177 CT ABD & PELVIS W/CONTRAST: CPT

## 2020-06-12 PROBLEM — K37 UNSPECIFIED APPENDICITIS: Chronic | Status: ACTIVE | Noted: 2020-06-10

## 2020-06-12 PROBLEM — K63.5 POLYP OF COLON: Chronic | Status: ACTIVE | Noted: 2020-06-10

## 2020-06-15 ENCOUNTER — OUTPATIENT (OUTPATIENT)
Dept: OUTPATIENT SERVICES | Facility: HOSPITAL | Age: 80
LOS: 1 days | End: 2020-06-15
Payer: MEDICARE

## 2020-06-15 DIAGNOSIS — Z11.59 ENCOUNTER FOR SCREENING FOR OTHER VIRAL DISEASES: ICD-10-CM

## 2020-06-15 DIAGNOSIS — Z98.51 TUBAL LIGATION STATUS: Chronic | ICD-10-CM

## 2020-06-16 ENCOUNTER — TRANSCRIPTION ENCOUNTER (OUTPATIENT)
Age: 80
End: 2020-06-16

## 2020-06-16 PROCEDURE — U0003: CPT

## 2020-06-16 RX ORDER — CEFOTETAN DISODIUM 1 G
2 VIAL (EA) INJECTION ONCE
Refills: 0 | Status: DISCONTINUED | OUTPATIENT
Start: 2020-06-17 | End: 2020-07-02

## 2020-06-17 ENCOUNTER — APPOINTMENT (OUTPATIENT)
Dept: SURGERY | Facility: HOSPITAL | Age: 80
End: 2020-06-17

## 2020-06-17 ENCOUNTER — OUTPATIENT (OUTPATIENT)
Dept: OUTPATIENT SERVICES | Facility: HOSPITAL | Age: 80
LOS: 1 days | End: 2020-06-17
Payer: MEDICARE

## 2020-06-17 ENCOUNTER — RESULT REVIEW (OUTPATIENT)
Age: 80
End: 2020-06-17

## 2020-06-17 VITALS
SYSTOLIC BLOOD PRESSURE: 149 MMHG | OXYGEN SATURATION: 96 % | TEMPERATURE: 98 F | DIASTOLIC BLOOD PRESSURE: 75 MMHG | WEIGHT: 162.04 LBS | HEART RATE: 63 BPM | RESPIRATION RATE: 14 BRPM | HEIGHT: 64 IN

## 2020-06-17 VITALS
SYSTOLIC BLOOD PRESSURE: 113 MMHG | DIASTOLIC BLOOD PRESSURE: 88 MMHG | OXYGEN SATURATION: 98 % | HEART RATE: 60 BPM | RESPIRATION RATE: 15 BRPM

## 2020-06-17 DIAGNOSIS — K35.30 ACUTE APPENDICITIS WITH LOCALIZED PERITONITIS, WITHOUT PERFORATION OR GANGRENE: ICD-10-CM

## 2020-06-17 DIAGNOSIS — Z98.51 TUBAL LIGATION STATUS: Chronic | ICD-10-CM

## 2020-06-17 DIAGNOSIS — Z01.818 ENCOUNTER FOR OTHER PREPROCEDURAL EXAMINATION: ICD-10-CM

## 2020-06-17 LAB — SARS-COV-2 RNA SPEC QL NAA+PROBE: SIGNIFICANT CHANGE UP

## 2020-06-17 PROCEDURE — 86900 BLOOD TYPING SEROLOGIC ABO: CPT

## 2020-06-17 PROCEDURE — 86850 RBC ANTIBODY SCREEN: CPT

## 2020-06-17 PROCEDURE — 82962 GLUCOSE BLOOD TEST: CPT

## 2020-06-17 PROCEDURE — C1889: CPT

## 2020-06-17 PROCEDURE — 44970 LAPAROSCOPY APPENDECTOMY: CPT

## 2020-06-17 PROCEDURE — 86901 BLOOD TYPING SEROLOGIC RH(D): CPT

## 2020-06-17 PROCEDURE — 88304 TISSUE EXAM BY PATHOLOGIST: CPT

## 2020-06-17 PROCEDURE — 44970 LAPAROSCOPY APPENDECTOMY: CPT | Mod: AS

## 2020-06-17 PROCEDURE — 36415 COLL VENOUS BLD VENIPUNCTURE: CPT

## 2020-06-17 PROCEDURE — 88304 TISSUE EXAM BY PATHOLOGIST: CPT | Mod: 26

## 2020-06-17 RX ORDER — CHOLECALCIFEROL (VITAMIN D3) 125 MCG
1 CAPSULE ORAL
Qty: 0 | Refills: 0 | DISCHARGE

## 2020-06-17 RX ORDER — ATORVASTATIN CALCIUM 80 MG/1
1 TABLET, FILM COATED ORAL
Qty: 0 | Refills: 0 | DISCHARGE

## 2020-06-17 RX ORDER — HYDROMORPHONE HYDROCHLORIDE 2 MG/ML
0.5 INJECTION INTRAMUSCULAR; INTRAVENOUS; SUBCUTANEOUS
Refills: 0 | Status: DISCONTINUED | OUTPATIENT
Start: 2020-06-17 | End: 2020-06-17

## 2020-06-17 RX ORDER — LOSARTAN POTASSIUM 100 MG/1
1 TABLET, FILM COATED ORAL
Qty: 0 | Refills: 0 | DISCHARGE

## 2020-06-17 RX ORDER — OXYCODONE HYDROCHLORIDE 5 MG/1
5 TABLET ORAL ONCE
Refills: 0 | Status: DISCONTINUED | OUTPATIENT
Start: 2020-06-17 | End: 2020-06-17

## 2020-06-17 RX ORDER — ASPIRIN/CALCIUM CARB/MAGNESIUM 324 MG
1 TABLET ORAL
Qty: 0 | Refills: 0 | DISCHARGE

## 2020-06-17 RX ORDER — ASCORBIC ACID 60 MG
1 TABLET,CHEWABLE ORAL
Qty: 0 | Refills: 0 | DISCHARGE

## 2020-06-17 RX ORDER — DOCUSATE SODIUM 100 MG
1 CAPSULE ORAL
Qty: 0 | Refills: 0 | DISCHARGE

## 2020-06-17 RX ORDER — AMLODIPINE BESYLATE 2.5 MG/1
1 TABLET ORAL
Qty: 0 | Refills: 0 | DISCHARGE

## 2020-06-17 RX ORDER — METOCLOPRAMIDE HCL 10 MG
5 TABLET ORAL ONCE
Refills: 0 | Status: DISCONTINUED | OUTPATIENT
Start: 2020-06-17 | End: 2020-06-17

## 2020-06-17 RX ORDER — METFORMIN HYDROCHLORIDE 850 MG/1
1 TABLET ORAL
Qty: 0 | Refills: 0 | DISCHARGE

## 2020-06-17 RX ORDER — METOPROLOL TARTRATE 50 MG
1 TABLET ORAL
Qty: 0 | Refills: 0 | DISCHARGE

## 2020-06-17 RX ORDER — UBIDECARENONE 100 MG
1 CAPSULE ORAL
Qty: 0 | Refills: 0 | DISCHARGE

## 2020-06-17 RX ORDER — ACETAMINOPHEN 500 MG
2 TABLET ORAL
Qty: 0 | Refills: 0 | DISCHARGE

## 2020-06-17 RX ORDER — OXYCODONE HYDROCHLORIDE 5 MG/1
1 TABLET ORAL
Qty: 7 | Refills: 0
Start: 2020-06-17

## 2020-06-17 RX ORDER — SODIUM CHLORIDE 9 MG/ML
1000 INJECTION, SOLUTION INTRAVENOUS
Refills: 0 | Status: DISCONTINUED | OUTPATIENT
Start: 2020-06-17 | End: 2020-06-17

## 2020-06-17 RX ADMIN — SODIUM CHLORIDE 110 MILLILITER(S): 9 INJECTION, SOLUTION INTRAVENOUS at 09:41

## 2020-06-17 NOTE — BRIEF OPERATIVE NOTE - NSICDXBRIEFPOSTOP_GEN_ALL_CORE_FT
POST-OP DIAGNOSIS:  Perforated appendicitis 17-Jun-2020 09:00:31 with abscess and appendiceal mass Kerri Valerio

## 2020-06-17 NOTE — BRIEF OPERATIVE NOTE - OPERATION/FINDINGS
complex appendectomy with history of perforated appendicitis with abscess and inflammatory appendiceal mass No

## 2020-06-17 NOTE — ASU DISCHARGE PLAN (ADULT/PEDIATRIC) - ASU DC SPECIAL INSTRUCTIONSFT
Keep incisions clean, dry and intact x 24 hrs. Apply water proof ice pack 20 mins on, 20 mins off to help decrease pain and swelling. After 24 hrs, you may begin showering as usual but do no scrub or soak incision sites. Pat dry. No tub baths. No swimming pools. No hot tubs. No exercise, sports, gym, strenuous activity, heavy lifting >5lbs. Take Oxycodone as prescribed for severe pain. Take Tylenol 1000mg every 6 hours as needed for mild pain. Take Aleve 220mg every 12 hours for moderate pain. You may take colace as needed for constipation while taking narcotic pain medication. Follow up with Dr. Singh in his office in one week.

## 2020-06-17 NOTE — ASU DISCHARGE PLAN (ADULT/PEDIATRIC) - CARE PROVIDER_API CALL
Pantera Singh  SURGERY  [address] [place] [address]  Phone: [phone]  Fax: [phone]  Follow Up Time: negative

## 2020-06-17 NOTE — ASU DISCHARGE PLAN (ADULT/PEDIATRIC) - NURSING INSTRUCTIONS
Discharge instructions to include medications and its indications for use completed. Activity restrictions discussed. Signs/symptoms requiring immediate medical attention discussed. Infection prevention measures discussed.

## 2020-06-25 ENCOUNTER — APPOINTMENT (OUTPATIENT)
Dept: SURGERY | Facility: CLINIC | Age: 80
End: 2020-06-25
Payer: MEDICARE

## 2020-06-25 VITALS
OXYGEN SATURATION: 97 % | HEART RATE: 88 BPM | BODY MASS INDEX: 29.06 KG/M2 | WEIGHT: 164 LBS | HEIGHT: 63 IN | RESPIRATION RATE: 14 BRPM | SYSTOLIC BLOOD PRESSURE: 154 MMHG | DIASTOLIC BLOOD PRESSURE: 81 MMHG

## 2020-06-25 PROCEDURE — 99024 POSTOP FOLLOW-UP VISIT: CPT

## 2020-07-29 ENCOUNTER — RX RENEWAL (OUTPATIENT)
Age: 80
End: 2020-07-29

## 2020-08-17 ENCOUNTER — APPOINTMENT (OUTPATIENT)
Dept: INTERNAL MEDICINE | Facility: CLINIC | Age: 80
End: 2020-08-17
Payer: MEDICARE

## 2020-08-17 VITALS
WEIGHT: 171 LBS | HEART RATE: 87 BPM | SYSTOLIC BLOOD PRESSURE: 165 MMHG | DIASTOLIC BLOOD PRESSURE: 82 MMHG | OXYGEN SATURATION: 96 % | RESPIRATION RATE: 16 BRPM | BODY MASS INDEX: 30.3 KG/M2 | HEIGHT: 63 IN

## 2020-08-17 DIAGNOSIS — Z01.818 ENCOUNTER FOR OTHER PREPROCEDURAL EXAMINATION: ICD-10-CM

## 2020-08-17 DIAGNOSIS — K36 OTHER APPENDICITIS: ICD-10-CM

## 2020-08-17 LAB
ALBUMIN SERPL ELPH-MCNC: 5 G/DL
ALP BLD-CCNC: 82 U/L
ALT SERPL-CCNC: 25 U/L
ANION GAP SERPL CALC-SCNC: 14 MMOL/L
APPEARANCE: CLEAR
AST SERPL-CCNC: 21 U/L
BASOPHILS # BLD AUTO: 0.08 K/UL
BASOPHILS NFR BLD AUTO: 1 %
BILIRUB SERPL-MCNC: 0.6 MG/DL
BILIRUBIN URINE: NEGATIVE
BLOOD URINE: NEGATIVE
BUN SERPL-MCNC: 20 MG/DL
CALCIUM SERPL-MCNC: 10.3 MG/DL
CHLORIDE SERPL-SCNC: 103 MMOL/L
CO2 SERPL-SCNC: 23 MMOL/L
COLOR: NORMAL
CREAT SERPL-MCNC: 0.82 MG/DL
EOSINOPHIL # BLD AUTO: 0.16 K/UL
EOSINOPHIL NFR BLD AUTO: 2.1 %
ESTIMATED AVERAGE GLUCOSE: 131 MG/DL
GLUCOSE QUALITATIVE U: NEGATIVE
GLUCOSE SERPL-MCNC: 213 MG/DL
HBA1C MFR BLD HPLC: 6.2 %
HCT VFR BLD CALC: 44.2 %
HGB BLD-MCNC: 14.5 G/DL
IMM GRANULOCYTES NFR BLD AUTO: 0.3 %
INR PPP: 1.05 RATIO
KETONES URINE: NEGATIVE
LEUKOCYTE ESTERASE URINE: NEGATIVE
LYMPHOCYTES # BLD AUTO: 1.61 K/UL
LYMPHOCYTES NFR BLD AUTO: 20.7 %
MAN DIFF?: NORMAL
MCHC RBC-ENTMCNC: 31 PG
MCHC RBC-ENTMCNC: 32.8 GM/DL
MCV RBC AUTO: 94.6 FL
MONOCYTES # BLD AUTO: 0.62 K/UL
MONOCYTES NFR BLD AUTO: 8 %
NEUTROPHILS # BLD AUTO: 5.3 K/UL
NEUTROPHILS NFR BLD AUTO: 67.9 %
NITRITE URINE: NEGATIVE
PH URINE: 6
PLATELET # BLD AUTO: 268 K/UL
POTASSIUM SERPL-SCNC: 4.2 MMOL/L
PROT SERPL-MCNC: 7.5 G/DL
PROTEIN URINE: NEGATIVE
PT BLD: 12.5 SEC
RBC # BLD: 4.67 M/UL
RBC # FLD: 13.7 %
SODIUM SERPL-SCNC: 140 MMOL/L
SPECIFIC GRAVITY URINE: >=1.03
UROBILINOGEN URINE: NORMAL
WBC # FLD AUTO: 7.79 K/UL

## 2020-08-17 PROCEDURE — 36415 COLL VENOUS BLD VENIPUNCTURE: CPT

## 2020-08-17 PROCEDURE — 99215 OFFICE O/P EST HI 40 MIN: CPT | Mod: 25

## 2020-08-21 ENCOUNTER — RX RENEWAL (OUTPATIENT)
Age: 80
End: 2020-08-21

## 2020-08-25 ENCOUNTER — RX RENEWAL (OUTPATIENT)
Age: 80
End: 2020-08-25

## 2020-09-03 ENCOUNTER — RX RENEWAL (OUTPATIENT)
Age: 80
End: 2020-09-03

## 2020-10-30 ENCOUNTER — NON-APPOINTMENT (OUTPATIENT)
Age: 80
End: 2020-10-30

## 2020-10-30 ENCOUNTER — APPOINTMENT (OUTPATIENT)
Dept: INTERNAL MEDICINE | Facility: CLINIC | Age: 80
End: 2020-10-30
Payer: MEDICARE

## 2020-10-30 VITALS
HEIGHT: 63 IN | HEART RATE: 87 BPM | WEIGHT: 173 LBS | OXYGEN SATURATION: 97 % | TEMPERATURE: 98.2 F | BODY MASS INDEX: 30.65 KG/M2 | SYSTOLIC BLOOD PRESSURE: 154 MMHG | DIASTOLIC BLOOD PRESSURE: 84 MMHG

## 2020-10-30 DIAGNOSIS — Z01.818 ENCOUNTER FOR OTHER PREPROCEDURAL EXAMINATION: ICD-10-CM

## 2020-10-30 DIAGNOSIS — H26.9 UNSPECIFIED CATARACT: ICD-10-CM

## 2020-10-30 PROCEDURE — G0439: CPT

## 2020-11-13 ENCOUNTER — RX RENEWAL (OUTPATIENT)
Age: 80
End: 2020-11-13

## 2021-02-25 ENCOUNTER — RX RENEWAL (OUTPATIENT)
Age: 81
End: 2021-02-25

## 2021-04-09 ENCOUNTER — APPOINTMENT (OUTPATIENT)
Dept: INTERNAL MEDICINE | Facility: CLINIC | Age: 81
End: 2021-04-09
Payer: MEDICARE

## 2021-04-09 VITALS
TEMPERATURE: 97.9 F | DIASTOLIC BLOOD PRESSURE: 79 MMHG | OXYGEN SATURATION: 98 % | BODY MASS INDEX: 31.18 KG/M2 | HEIGHT: 63 IN | SYSTOLIC BLOOD PRESSURE: 175 MMHG | WEIGHT: 176 LBS | RESPIRATION RATE: 16 BRPM | HEART RATE: 77 BPM

## 2021-04-09 VITALS — SYSTOLIC BLOOD PRESSURE: 133 MMHG | DIASTOLIC BLOOD PRESSURE: 73 MMHG

## 2021-04-09 DIAGNOSIS — R80.9 PROTEINURIA, UNSPECIFIED: ICD-10-CM

## 2021-04-09 PROCEDURE — 99214 OFFICE O/P EST MOD 30 MIN: CPT

## 2021-05-09 ENCOUNTER — RX RENEWAL (OUTPATIENT)
Age: 81
End: 2021-05-09

## 2021-06-07 ENCOUNTER — RX RENEWAL (OUTPATIENT)
Age: 81
End: 2021-06-07

## 2021-07-19 NOTE — ED PROVIDER NOTE - NS_BEDUNITTYPES_ED_ALL_ED
Problem: Diabetes  Goal: Glycemic balance achieved/maintained  Description: Goal is to maintain blood sugar within range with no episodes of hypoglycemia  7/19/2021 1227 by Iliana Jones RN  Outcome: Outcome Met, Complete Goal  7/19/2021 0942 by Iliana Jones RN  Outcome: Outcome Met, Continue evaluating goal progress toward completion  Goal: Verbalizes/demonstrates understanding of Diabetes  Description: Document on Patient Education Activity  7/19/2021 1227 by Iliana Jones RN  Outcome: Outcome Met, Complete Goal  7/19/2021 0942 by Iliana Jones RN  Outcome: Outcome Met, Continue evaluating goal progress toward completion  Goal: Demonstrates ability to self-administer insulin  Description: Document on Patient Education Activity  7/19/2021 1227 by Iliana Jones RN  Outcome: Outcome Met, Complete Goal  7/19/2021 0942 by Iliana Jones RN  Outcome: Outcome Met, Continue evaluating goal progress toward completion      MED/SURG

## 2021-08-25 ENCOUNTER — RX RENEWAL (OUTPATIENT)
Age: 81
End: 2021-08-25

## 2021-09-29 ENCOUNTER — RX RENEWAL (OUTPATIENT)
Age: 81
End: 2021-09-29

## 2021-10-31 ENCOUNTER — RX RENEWAL (OUTPATIENT)
Age: 81
End: 2021-10-31

## 2021-11-01 ENCOUNTER — LABORATORY RESULT (OUTPATIENT)
Age: 81
End: 2021-11-01

## 2021-11-01 ENCOUNTER — NON-APPOINTMENT (OUTPATIENT)
Age: 81
End: 2021-11-01

## 2021-11-01 ENCOUNTER — APPOINTMENT (OUTPATIENT)
Dept: INTERNAL MEDICINE | Facility: CLINIC | Age: 81
End: 2021-11-01
Payer: MEDICARE

## 2021-11-01 VITALS
BODY MASS INDEX: 31.01 KG/M2 | WEIGHT: 175 LBS | HEART RATE: 74 BPM | OXYGEN SATURATION: 98 % | RESPIRATION RATE: 16 BRPM | DIASTOLIC BLOOD PRESSURE: 82 MMHG | TEMPERATURE: 97.9 F | HEIGHT: 63 IN | SYSTOLIC BLOOD PRESSURE: 143 MMHG

## 2021-11-01 LAB
25(OH)D3 SERPL-MCNC: 54.8 NG/ML
APPEARANCE: ABNORMAL
BASOPHILS # BLD AUTO: 0.07 K/UL
BASOPHILS NFR BLD AUTO: 1.2 %
BILIRUBIN URINE: NEGATIVE
BLOOD URINE: NEGATIVE
COLOR: YELLOW
EOSINOPHIL # BLD AUTO: 0.13 K/UL
EOSINOPHIL NFR BLD AUTO: 2.3 %
GLUCOSE QUALITATIVE U: NEGATIVE
HBA1C MFR BLD HPLC: 7.3
HCT VFR BLD CALC: 42.5 %
HGB BLD-MCNC: 13.8 G/DL
IMM GRANULOCYTES NFR BLD AUTO: 0.2 %
KETONES URINE: NEGATIVE
LEUKOCYTE ESTERASE URINE: NEGATIVE
LYMPHOCYTES # BLD AUTO: 1.22 K/UL
LYMPHOCYTES NFR BLD AUTO: 21.3 %
MAN DIFF?: NORMAL
MCHC RBC-ENTMCNC: 30.4 PG
MCHC RBC-ENTMCNC: 32.5 GM/DL
MCV RBC AUTO: 93.6 FL
MONOCYTES # BLD AUTO: 0.59 K/UL
MONOCYTES NFR BLD AUTO: 10.3 %
NEUTROPHILS # BLD AUTO: 3.7 K/UL
NEUTROPHILS NFR BLD AUTO: 64.7 %
NITRITE URINE: NEGATIVE
PH URINE: 5.5
PLATELET # BLD AUTO: 268 K/UL
PROTEIN URINE: NEGATIVE
RBC # BLD: 4.54 M/UL
RBC # FLD: 12.8 %
SPECIFIC GRAVITY URINE: 1.02
UROBILINOGEN URINE: NORMAL
WBC # FLD AUTO: 5.72 K/UL

## 2021-11-01 PROCEDURE — G0439: CPT

## 2021-11-01 PROCEDURE — 36415 COLL VENOUS BLD VENIPUNCTURE: CPT

## 2021-11-01 PROCEDURE — 99497 ADVNCD CARE PLAN 30 MIN: CPT

## 2021-11-01 PROCEDURE — G0444 DEPRESSION SCREEN ANNUAL: CPT | Mod: 59

## 2021-11-01 PROCEDURE — 93000 ELECTROCARDIOGRAM COMPLETE: CPT | Mod: 59

## 2021-11-01 RX ORDER — METFORMIN ER 500 MG 500 MG/1
500 TABLET ORAL 3 TIMES DAILY
Qty: 90 | Refills: 0 | Status: ACTIVE | COMMUNITY

## 2021-11-18 ENCOUNTER — RX RENEWAL (OUTPATIENT)
Age: 81
End: 2021-11-18

## 2021-11-21 ENCOUNTER — RX RENEWAL (OUTPATIENT)
Age: 81
End: 2021-11-21

## 2021-12-06 NOTE — H&P PST ADULT - NSICDXPROBLEM_GEN_ALL_CORE_FT
ANAND MONTENEGRO ; 01/06/2022 ; [facility] [address]
PROBLEM DIAGNOSES  Problem: Acute appendicitis with peritonitis  Assessment and Plan: 78yo female  scheduled for laparoscopic possible open appendectomy.    80 yo female interviewed over the phone due to COVID pandemic. Physical  will be completed on the day of procedure. COVID testing will be done in [facility]. Pre-procedure instructions given and pt verbalized understanding. Medical eval advised.

## 2022-01-06 ENCOUNTER — APPOINTMENT (OUTPATIENT)
Dept: INTERNAL MEDICINE | Facility: CLINIC | Age: 82
End: 2022-01-06
Payer: MEDICARE

## 2022-01-06 VITALS
WEIGHT: 172.19 LBS | HEART RATE: 79 BPM | TEMPERATURE: 98 F | OXYGEN SATURATION: 98 % | RESPIRATION RATE: 16 BRPM | SYSTOLIC BLOOD PRESSURE: 136 MMHG | HEIGHT: 63 IN | BODY MASS INDEX: 30.51 KG/M2 | DIASTOLIC BLOOD PRESSURE: 87 MMHG

## 2022-01-06 DIAGNOSIS — Z92.29 PERSONAL HISTORY OF OTHER DRUG THERAPY: ICD-10-CM

## 2022-01-06 PROCEDURE — 99214 OFFICE O/P EST MOD 30 MIN: CPT

## 2022-01-06 RX ORDER — ASPIRIN ENTERIC COATED TABLETS 81 MG 81 MG/1
81 TABLET, DELAYED RELEASE ORAL
Refills: 0 | Status: DISCONTINUED | COMMUNITY
End: 2022-01-06

## 2022-01-09 PROBLEM — Z92.29 COVID-19 VACCINE SERIES COMPLETED: Status: ACTIVE | Noted: 2021-11-01

## 2022-01-11 ENCOUNTER — OUTPATIENT (OUTPATIENT)
Dept: OUTPATIENT SERVICES | Facility: HOSPITAL | Age: 82
LOS: 1 days | End: 2022-01-11
Payer: MEDICARE

## 2022-01-11 ENCOUNTER — APPOINTMENT (OUTPATIENT)
Dept: ULTRASOUND IMAGING | Facility: CLINIC | Age: 82
End: 2022-01-11
Payer: MEDICARE

## 2022-01-11 DIAGNOSIS — Z98.51 TUBAL LIGATION STATUS: Chronic | ICD-10-CM

## 2022-01-11 DIAGNOSIS — R10.11 RIGHT UPPER QUADRANT PAIN: ICD-10-CM

## 2022-01-11 PROCEDURE — 76700 US EXAM ABDOM COMPLETE: CPT | Mod: 26

## 2022-01-11 PROCEDURE — 76700 US EXAM ABDOM COMPLETE: CPT

## 2022-02-21 ENCOUNTER — RX RENEWAL (OUTPATIENT)
Age: 82
End: 2022-02-21

## 2022-05-17 ENCOUNTER — RX RENEWAL (OUTPATIENT)
Age: 82
End: 2022-05-17

## 2022-05-19 ENCOUNTER — RX RENEWAL (OUTPATIENT)
Age: 82
End: 2022-05-19

## 2022-05-23 ENCOUNTER — RX RENEWAL (OUTPATIENT)
Age: 82
End: 2022-05-23

## 2022-08-07 ENCOUNTER — RX RENEWAL (OUTPATIENT)
Age: 82
End: 2022-08-07

## 2022-08-11 ENCOUNTER — APPOINTMENT (OUTPATIENT)
Dept: INTERNAL MEDICINE | Facility: CLINIC | Age: 82
End: 2022-08-11

## 2022-08-11 ENCOUNTER — LABORATORY RESULT (OUTPATIENT)
Age: 82
End: 2022-08-11

## 2022-08-11 VITALS
RESPIRATION RATE: 16 BRPM | HEART RATE: 77 BPM | DIASTOLIC BLOOD PRESSURE: 78 MMHG | TEMPERATURE: 98 F | OXYGEN SATURATION: 96 % | SYSTOLIC BLOOD PRESSURE: 155 MMHG | HEIGHT: 63 IN | BODY MASS INDEX: 30.12 KG/M2 | WEIGHT: 170 LBS

## 2022-08-11 VITALS — SYSTOLIC BLOOD PRESSURE: 140 MMHG | DIASTOLIC BLOOD PRESSURE: 80 MMHG

## 2022-08-11 DIAGNOSIS — R10.11 RIGHT UPPER QUADRANT PAIN: ICD-10-CM

## 2022-08-11 PROCEDURE — 99214 OFFICE O/P EST MOD 30 MIN: CPT | Mod: 25

## 2022-08-11 PROCEDURE — 36415 COLL VENOUS BLD VENIPUNCTURE: CPT

## 2022-08-14 LAB
ALBUMIN SERPL ELPH-MCNC: 4.9 G/DL
ALP BLD-CCNC: 73 U/L
ALT SERPL-CCNC: 30 U/L
ANION GAP SERPL CALC-SCNC: 14 MMOL/L
APPEARANCE: ABNORMAL
AST SERPL-CCNC: 27 U/L
BASOPHILS # BLD AUTO: 0.07 K/UL
BASOPHILS NFR BLD AUTO: 1 %
BILIRUB SERPL-MCNC: 0.7 MG/DL
BILIRUBIN URINE: NEGATIVE
BLOOD URINE: NEGATIVE
BUN SERPL-MCNC: 19 MG/DL
CALCIUM SERPL-MCNC: 10.1 MG/DL
CHLORIDE SERPL-SCNC: 104 MMOL/L
CHOLEST SERPL-MCNC: 122 MG/DL
CO2 SERPL-SCNC: 23 MMOL/L
COLOR: NORMAL
CREAT SERPL-MCNC: 0.82 MG/DL
EGFR: 72 ML/MIN/1.73M2
EOSINOPHIL # BLD AUTO: 0.23 K/UL
EOSINOPHIL NFR BLD AUTO: 3.4 %
ESTIMATED AVERAGE GLUCOSE: 148 MG/DL
GLUCOSE QUALITATIVE U: NEGATIVE
GLUCOSE SERPL-MCNC: 147 MG/DL
HBA1C MFR BLD HPLC: 6.8 %
HCT VFR BLD CALC: 41.9 %
HDLC SERPL-MCNC: 34 MG/DL
HGB BLD-MCNC: 13.9 G/DL
IMM GRANULOCYTES NFR BLD AUTO: 0.1 %
KETONES URINE: NEGATIVE
LDLC SERPL CALC-MCNC: 50 MG/DL
LEUKOCYTE ESTERASE URINE: NEGATIVE
LYMPHOCYTES # BLD AUTO: 1.46 K/UL
LYMPHOCYTES NFR BLD AUTO: 21.5 %
MAN DIFF?: NORMAL
MCHC RBC-ENTMCNC: 30.8 PG
MCHC RBC-ENTMCNC: 33.2 GM/DL
MCV RBC AUTO: 92.9 FL
MONOCYTES # BLD AUTO: 0.68 K/UL
MONOCYTES NFR BLD AUTO: 10 %
NEUTROPHILS # BLD AUTO: 4.33 K/UL
NEUTROPHILS NFR BLD AUTO: 64 %
NITRITE URINE: NEGATIVE
NONHDLC SERPL-MCNC: 89 MG/DL
PH URINE: 6
PLATELET # BLD AUTO: 268 K/UL
POTASSIUM SERPL-SCNC: 4.2 MMOL/L
PROT SERPL-MCNC: 7.5 G/DL
PROTEIN URINE: NORMAL
RBC # BLD: 4.51 M/UL
RBC # FLD: 13.4 %
SODIUM SERPL-SCNC: 141 MMOL/L
SPECIFIC GRAVITY URINE: >=1.03
TRIGL SERPL-MCNC: 196 MG/DL
UROBILINOGEN URINE: NORMAL
WBC # FLD AUTO: 6.78 K/UL

## 2022-08-16 ENCOUNTER — RX RENEWAL (OUTPATIENT)
Age: 82
End: 2022-08-16

## 2022-11-04 ENCOUNTER — APPOINTMENT (OUTPATIENT)
Dept: INTERNAL MEDICINE | Facility: CLINIC | Age: 82
End: 2022-11-04

## 2022-11-04 VITALS
SYSTOLIC BLOOD PRESSURE: 173 MMHG | RESPIRATION RATE: 16 BRPM | TEMPERATURE: 98 F | HEART RATE: 81 BPM | HEIGHT: 63 IN | DIASTOLIC BLOOD PRESSURE: 73 MMHG | WEIGHT: 173 LBS | OXYGEN SATURATION: 96 % | BODY MASS INDEX: 30.65 KG/M2

## 2022-11-04 VITALS — SYSTOLIC BLOOD PRESSURE: 140 MMHG | DIASTOLIC BLOOD PRESSURE: 80 MMHG

## 2022-11-04 VITALS — DIASTOLIC BLOOD PRESSURE: 80 MMHG | SYSTOLIC BLOOD PRESSURE: 158 MMHG

## 2022-11-04 PROCEDURE — 99497 ADVNCD CARE PLAN 30 MIN: CPT

## 2022-11-04 PROCEDURE — G0444 DEPRESSION SCREEN ANNUAL: CPT | Mod: 59

## 2022-11-04 PROCEDURE — G0439: CPT

## 2022-11-04 RX ORDER — CHLORHEXIDINE GLUCONATE 4 %
LIQUID (ML) TOPICAL
Refills: 0 | Status: ACTIVE | COMMUNITY

## 2022-11-04 RX ORDER — PSYLLIUM HUSK 0.4 G
CAPSULE ORAL
Refills: 0 | Status: DISCONTINUED | COMMUNITY
End: 2022-11-04

## 2023-05-01 ENCOUNTER — APPOINTMENT (OUTPATIENT)
Dept: INTERNAL MEDICINE | Facility: CLINIC | Age: 83
End: 2023-05-01
Payer: MEDICARE

## 2023-05-01 VITALS
OXYGEN SATURATION: 96 % | DIASTOLIC BLOOD PRESSURE: 76 MMHG | SYSTOLIC BLOOD PRESSURE: 155 MMHG | HEIGHT: 63 IN | TEMPERATURE: 98 F | HEART RATE: 78 BPM | WEIGHT: 175 LBS | BODY MASS INDEX: 31.01 KG/M2

## 2023-05-01 VITALS — DIASTOLIC BLOOD PRESSURE: 70 MMHG | SYSTOLIC BLOOD PRESSURE: 140 MMHG

## 2023-05-01 DIAGNOSIS — R06.83 SNORING: ICD-10-CM

## 2023-05-01 DIAGNOSIS — Z13.29 ENCOUNTER FOR SCREENING FOR OTHER SUSPECTED ENDOCRINE DISORDER: ICD-10-CM

## 2023-05-01 DIAGNOSIS — H61.23 IMPACTED CERUMEN, BILATERAL: ICD-10-CM

## 2023-05-01 DIAGNOSIS — H93.13 TINNITUS, BILATERAL: ICD-10-CM

## 2023-05-01 DIAGNOSIS — R40.0 SOMNOLENCE: ICD-10-CM

## 2023-05-01 DIAGNOSIS — G47.9 SLEEP DISORDER, UNSPECIFIED: ICD-10-CM

## 2023-05-01 DIAGNOSIS — H26.9 UNSPECIFIED CATARACT: ICD-10-CM

## 2023-05-01 PROCEDURE — G0009: CPT

## 2023-05-01 PROCEDURE — 99214 OFFICE O/P EST MOD 30 MIN: CPT | Mod: 25

## 2023-05-01 PROCEDURE — 90677 PCV20 VACCINE IM: CPT

## 2023-05-01 RX ORDER — EZETIMIBE 10 MG/1
10 TABLET ORAL
Refills: 0 | Status: DISCONTINUED | COMMUNITY
Start: 2022-09-30 | End: 2023-05-01

## 2023-05-01 RX ORDER — METOPROLOL SUCCINATE 100 MG/1
100 TABLET, EXTENDED RELEASE ORAL
Qty: 90 | Refills: 1 | Status: DISCONTINUED | COMMUNITY
Start: 2019-11-26 | End: 2023-05-01

## 2023-11-06 ENCOUNTER — APPOINTMENT (OUTPATIENT)
Dept: INTERNAL MEDICINE | Facility: CLINIC | Age: 83
End: 2023-11-06
Payer: MEDICARE

## 2023-11-06 VITALS
BODY MASS INDEX: 31.54 KG/M2 | OXYGEN SATURATION: 96 % | TEMPERATURE: 98.1 F | HEIGHT: 63 IN | DIASTOLIC BLOOD PRESSURE: 76 MMHG | SYSTOLIC BLOOD PRESSURE: 180 MMHG | WEIGHT: 178 LBS | HEART RATE: 96 BPM

## 2023-11-06 VITALS — SYSTOLIC BLOOD PRESSURE: 140 MMHG | DIASTOLIC BLOOD PRESSURE: 70 MMHG

## 2023-11-06 DIAGNOSIS — Z00.00 ENCOUNTER FOR GENERAL ADULT MEDICAL EXAMINATION W/OUT ABNORMAL FINDINGS: ICD-10-CM

## 2023-11-06 DIAGNOSIS — Z87.39 PERSONAL HISTORY OF OTHER DISEASES OF THE MUSCULOSKELETAL SYSTEM AND CONNECTIVE TISSUE: ICD-10-CM

## 2023-11-06 DIAGNOSIS — Z23 ENCOUNTER FOR IMMUNIZATION: ICD-10-CM

## 2023-11-06 PROCEDURE — G0439: CPT

## 2024-05-06 ENCOUNTER — LABORATORY RESULT (OUTPATIENT)
Age: 84
End: 2024-05-06

## 2024-05-06 ENCOUNTER — APPOINTMENT (OUTPATIENT)
Dept: INTERNAL MEDICINE | Facility: CLINIC | Age: 84
End: 2024-05-06
Payer: MEDICARE

## 2024-05-06 VITALS
DIASTOLIC BLOOD PRESSURE: 66 MMHG | OXYGEN SATURATION: 97 % | SYSTOLIC BLOOD PRESSURE: 181 MMHG | BODY MASS INDEX: 31.32 KG/M2 | HEART RATE: 91 BPM | TEMPERATURE: 97.4 F | HEIGHT: 63.5 IN | WEIGHT: 179 LBS

## 2024-05-06 VITALS — DIASTOLIC BLOOD PRESSURE: 70 MMHG | SYSTOLIC BLOOD PRESSURE: 140 MMHG

## 2024-05-06 DIAGNOSIS — I15.2 TYPE 2 DIABETES MELLITUS WITH OTHER SPECIFIED COMPLICATION: ICD-10-CM

## 2024-05-06 DIAGNOSIS — N32.81 OVERACTIVE BLADDER: ICD-10-CM

## 2024-05-06 DIAGNOSIS — E11.69 TYPE 2 DIABETES MELLITUS WITH OTHER SPECIFIED COMPLICATION: ICD-10-CM

## 2024-05-06 DIAGNOSIS — E11.9 TYPE 2 DIABETES MELLITUS W/OUT COMPLICATIONS: ICD-10-CM

## 2024-05-06 DIAGNOSIS — E66.9 TYPE 2 DIABETES MELLITUS WITH OTHER SPECIFIED COMPLICATION: ICD-10-CM

## 2024-05-06 DIAGNOSIS — Z92.89 PERSONAL HISTORY OF OTHER MEDICAL TREATMENT: ICD-10-CM

## 2024-05-06 DIAGNOSIS — E78.6 LIPOPROTEIN DEFICIENCY: ICD-10-CM

## 2024-05-06 DIAGNOSIS — K76.0 FATTY (CHANGE OF) LIVER, NOT ELSEWHERE CLASSIFIED: ICD-10-CM

## 2024-05-06 DIAGNOSIS — G47.00 INSOMNIA, UNSPECIFIED: ICD-10-CM

## 2024-05-06 DIAGNOSIS — E11.59 TYPE 2 DIABETES MELLITUS WITH OTHER SPECIFIED COMPLICATION: ICD-10-CM

## 2024-05-06 DIAGNOSIS — I10 ESSENTIAL (PRIMARY) HYPERTENSION: ICD-10-CM

## 2024-05-06 DIAGNOSIS — E78.1 PURE HYPERGLYCERIDEMIA: ICD-10-CM

## 2024-05-06 DIAGNOSIS — M85.80 OTHER SPECIFIED DISORDERS OF BONE DENSITY AND STRUCTURE, UNSPECIFIED SITE: ICD-10-CM

## 2024-05-06 DIAGNOSIS — E66.9 OBESITY, UNSPECIFIED: ICD-10-CM

## 2024-05-06 DIAGNOSIS — Z86.39 PERSONAL HISTORY OF OTHER ENDOCRINE, NUTRITIONAL AND METABOLIC DISEASE: ICD-10-CM

## 2024-05-06 PROCEDURE — 99214 OFFICE O/P EST MOD 30 MIN: CPT

## 2024-05-06 PROCEDURE — G2211 COMPLEX E/M VISIT ADD ON: CPT

## 2024-05-06 PROCEDURE — 36415 COLL VENOUS BLD VENIPUNCTURE: CPT

## 2024-05-06 RX ORDER — METOPROLOL SUCCINATE 200 MG/1
200 TABLET, EXTENDED RELEASE ORAL
Qty: 90 | Refills: 1 | Status: ACTIVE | COMMUNITY
Start: 2020-09-03 | End: 1900-01-01

## 2024-05-06 RX ORDER — AMLODIPINE BESYLATE 10 MG/1
10 TABLET ORAL
Qty: 90 | Refills: 1 | Status: ACTIVE | COMMUNITY
Start: 2020-05-08 | End: 1900-01-01

## 2024-05-06 RX ORDER — ATORVASTATIN CALCIUM 10 MG/1
10 TABLET, FILM COATED ORAL
Qty: 90 | Refills: 1 | Status: ACTIVE | COMMUNITY
Start: 2020-05-31 | End: 1900-01-01

## 2024-05-06 RX ORDER — LOSARTAN POTASSIUM 100 MG/1
100 TABLET, FILM COATED ORAL
Qty: 90 | Refills: 1 | Status: ACTIVE | COMMUNITY
Start: 2020-05-31 | End: 1900-01-01

## 2024-05-07 DIAGNOSIS — R79.89 OTHER SPECIFIED ABNORMAL FINDINGS OF BLOOD CHEMISTRY: ICD-10-CM

## 2024-05-07 DIAGNOSIS — E83.52 HYPERCALCEMIA: ICD-10-CM

## 2024-05-08 LAB
25(OH)D3 SERPL-MCNC: 46.6 NG/ML
ALBUMIN SERPL ELPH-MCNC: 5 G/DL
ALP BLD-CCNC: 85 U/L
ALT SERPL-CCNC: 52 U/L
ANION GAP SERPL CALC-SCNC: 17 MMOL/L
APPEARANCE: ABNORMAL
AST SERPL-CCNC: 39 U/L
BASOPHILS # BLD AUTO: 0.08 K/UL
BASOPHILS NFR BLD AUTO: 1 %
BILIRUB SERPL-MCNC: 0.6 MG/DL
BILIRUBIN URINE: ABNORMAL
BLOOD URINE: NEGATIVE
BUN SERPL-MCNC: 23 MG/DL
CALCIUM SERPL-MCNC: 10.6 MG/DL
CALCIUM SERPL-MCNC: 10.8 MG/DL
CHLORIDE SERPL-SCNC: 99 MMOL/L
CHOLEST SERPL-MCNC: 124 MG/DL
CO2 SERPL-SCNC: 24 MMOL/L
COLOR: NORMAL
CREAT SERPL-MCNC: 0.83 MG/DL
EGFR: 71 ML/MIN/1.73M2
EOSINOPHIL # BLD AUTO: 0.15 K/UL
EOSINOPHIL NFR BLD AUTO: 1.8 %
ESTIMATED AVERAGE GLUCOSE: 171 MG/DL
GLUCOSE QUALITATIVE U: NEGATIVE MG/DL
GLUCOSE SERPL-MCNC: 196 MG/DL
HAV IGM SER QL: NONREACTIVE
HBA1C MFR BLD HPLC: 7.6 %
HBV CORE IGM SER QL: NONREACTIVE
HBV SURFACE AG SER QL: NONREACTIVE
HCT VFR BLD CALC: 44.2 %
HCV AB SER QL: NONREACTIVE
HCV S/CO RATIO: 0.08 S/CO
HDLC SERPL-MCNC: 34 MG/DL
HGB BLD-MCNC: 14.3 G/DL
IMM GRANULOCYTES NFR BLD AUTO: 0.1 %
KETONES URINE: 15 MG/DL
LDLC SERPL CALC-MCNC: 44 MG/DL
LEUKOCYTE ESTERASE URINE: ABNORMAL
LYMPHOCYTES # BLD AUTO: 1.71 K/UL
LYMPHOCYTES NFR BLD AUTO: 20.9 %
MAN DIFF?: NORMAL
MCHC RBC-ENTMCNC: 30.7 PG
MCHC RBC-ENTMCNC: 32.4 GM/DL
MCV RBC AUTO: 94.8 FL
MONOCYTES # BLD AUTO: 0.71 K/UL
MONOCYTES NFR BLD AUTO: 8.7 %
NEUTROPHILS # BLD AUTO: 5.53 K/UL
NEUTROPHILS NFR BLD AUTO: 67.5 %
NITRITE URINE: NEGATIVE
NONHDLC SERPL-MCNC: 90 MG/DL
PARATHYROID HORMONE INTACT: 16 PG/ML
PH URINE: 5.5
PLATELET # BLD AUTO: 270 K/UL
POTASSIUM SERPL-SCNC: 4.2 MMOL/L
PROT SERPL-MCNC: 7.4 G/DL
PROTEIN URINE: 30 MG/DL
RBC # BLD: 4.66 M/UL
RBC # FLD: 13.4 %
SODIUM SERPL-SCNC: 140 MMOL/L
SPECIFIC GRAVITY URINE: >1.03
TRIGL SERPL-MCNC: 303 MG/DL
TSH SERPL-ACNC: 2.55 UIU/ML
UROBILINOGEN URINE: 1 MG/DL
WBC # FLD AUTO: 8.19 K/UL

## 2024-09-06 ENCOUNTER — APPOINTMENT (OUTPATIENT)
Dept: INTERNAL MEDICINE | Facility: CLINIC | Age: 84
End: 2024-09-06
Payer: MEDICARE

## 2024-09-06 VITALS
DIASTOLIC BLOOD PRESSURE: 76 MMHG | BODY MASS INDEX: 30.22 KG/M2 | HEIGHT: 64.37 IN | OXYGEN SATURATION: 97 % | WEIGHT: 177 LBS | HEART RATE: 93 BPM | SYSTOLIC BLOOD PRESSURE: 153 MMHG | TEMPERATURE: 98 F

## 2024-09-06 VITALS — DIASTOLIC BLOOD PRESSURE: 70 MMHG | SYSTOLIC BLOOD PRESSURE: 140 MMHG

## 2024-09-06 DIAGNOSIS — M85.80 OTHER SPECIFIED DISORDERS OF BONE DENSITY AND STRUCTURE, UNSPECIFIED SITE: ICD-10-CM

## 2024-09-06 DIAGNOSIS — E11.59 TYPE 2 DIABETES MELLITUS WITH OTHER SPECIFIED COMPLICATION: ICD-10-CM

## 2024-09-06 DIAGNOSIS — I10 ESSENTIAL (PRIMARY) HYPERTENSION: ICD-10-CM

## 2024-09-06 DIAGNOSIS — R79.89 OTHER SPECIFIED ABNORMAL FINDINGS OF BLOOD CHEMISTRY: ICD-10-CM

## 2024-09-06 DIAGNOSIS — N32.81 OVERACTIVE BLADDER: ICD-10-CM

## 2024-09-06 DIAGNOSIS — Z86.39 PERSONAL HISTORY OF OTHER ENDOCRINE, NUTRITIONAL AND METABOLIC DISEASE: ICD-10-CM

## 2024-09-06 DIAGNOSIS — G47.00 INSOMNIA, UNSPECIFIED: ICD-10-CM

## 2024-09-06 DIAGNOSIS — E78.1 PURE HYPERGLYCERIDEMIA: ICD-10-CM

## 2024-09-06 DIAGNOSIS — I15.2 TYPE 2 DIABETES MELLITUS WITH OTHER SPECIFIED COMPLICATION: ICD-10-CM

## 2024-09-06 DIAGNOSIS — E66.9 TYPE 2 DIABETES MELLITUS WITH OTHER SPECIFIED COMPLICATION: ICD-10-CM

## 2024-09-06 DIAGNOSIS — E11.9 TYPE 2 DIABETES MELLITUS W/OUT COMPLICATIONS: ICD-10-CM

## 2024-09-06 DIAGNOSIS — E78.6 LIPOPROTEIN DEFICIENCY: ICD-10-CM

## 2024-09-06 DIAGNOSIS — K76.0 FATTY (CHANGE OF) LIVER, NOT ELSEWHERE CLASSIFIED: ICD-10-CM

## 2024-09-06 DIAGNOSIS — E66.9 OBESITY, UNSPECIFIED: ICD-10-CM

## 2024-09-06 DIAGNOSIS — E11.69 TYPE 2 DIABETES MELLITUS WITH OTHER SPECIFIED COMPLICATION: ICD-10-CM

## 2024-09-06 LAB — HBA1C MFR BLD HPLC: 7.9

## 2024-09-06 PROCEDURE — G2211 COMPLEX E/M VISIT ADD ON: CPT

## 2024-09-06 PROCEDURE — 99214 OFFICE O/P EST MOD 30 MIN: CPT

## 2024-09-06 RX ORDER — EMPAGLIFLOZIN 10 MG/1
10 TABLET, FILM COATED ORAL
Refills: 0 | Status: ACTIVE | COMMUNITY

## 2024-11-18 ENCOUNTER — RX RENEWAL (OUTPATIENT)
Age: 84
End: 2024-11-18

## 2024-12-20 ENCOUNTER — NON-APPOINTMENT (OUTPATIENT)
Age: 84
End: 2024-12-20

## 2024-12-20 ENCOUNTER — APPOINTMENT (OUTPATIENT)
Dept: INTERNAL MEDICINE | Facility: CLINIC | Age: 84
End: 2024-12-20
Payer: MEDICARE

## 2024-12-20 VITALS
RESPIRATION RATE: 16 BRPM | SYSTOLIC BLOOD PRESSURE: 155 MMHG | WEIGHT: 170 LBS | HEIGHT: 64 IN | HEART RATE: 68 BPM | TEMPERATURE: 97.9 F | BODY MASS INDEX: 29.02 KG/M2 | OXYGEN SATURATION: 97 % | DIASTOLIC BLOOD PRESSURE: 78 MMHG

## 2024-12-20 VITALS — DIASTOLIC BLOOD PRESSURE: 60 MMHG | SYSTOLIC BLOOD PRESSURE: 120 MMHG

## 2024-12-20 DIAGNOSIS — N32.81 OVERACTIVE BLADDER: ICD-10-CM

## 2024-12-20 DIAGNOSIS — E66.9 TYPE 2 DIABETES MELLITUS WITH OTHER SPECIFIED COMPLICATION: ICD-10-CM

## 2024-12-20 DIAGNOSIS — M19.90 UNSPECIFIED OSTEOARTHRITIS, UNSPECIFIED SITE: ICD-10-CM

## 2024-12-20 DIAGNOSIS — E78.6 LIPOPROTEIN DEFICIENCY: ICD-10-CM

## 2024-12-20 DIAGNOSIS — M85.80 OTHER SPECIFIED DISORDERS OF BONE DENSITY AND STRUCTURE, UNSPECIFIED SITE: ICD-10-CM

## 2024-12-20 DIAGNOSIS — E66.9 OBESITY, UNSPECIFIED: ICD-10-CM

## 2024-12-20 DIAGNOSIS — E11.9 TYPE 2 DIABETES MELLITUS W/OUT COMPLICATIONS: ICD-10-CM

## 2024-12-20 DIAGNOSIS — E11.59 TYPE 2 DIABETES MELLITUS WITH OTHER SPECIFIED COMPLICATION: ICD-10-CM

## 2024-12-20 DIAGNOSIS — Z00.00 ENCOUNTER FOR GENERAL ADULT MEDICAL EXAMINATION W/OUT ABNORMAL FINDINGS: ICD-10-CM

## 2024-12-20 DIAGNOSIS — I15.2 TYPE 2 DIABETES MELLITUS WITH OTHER SPECIFIED COMPLICATION: ICD-10-CM

## 2024-12-20 DIAGNOSIS — K76.0 FATTY (CHANGE OF) LIVER, NOT ELSEWHERE CLASSIFIED: ICD-10-CM

## 2024-12-20 DIAGNOSIS — Z86.39 PERSONAL HISTORY OF OTHER ENDOCRINE, NUTRITIONAL AND METABOLIC DISEASE: ICD-10-CM

## 2024-12-20 DIAGNOSIS — R79.89 OTHER SPECIFIED ABNORMAL FINDINGS OF BLOOD CHEMISTRY: ICD-10-CM

## 2024-12-20 DIAGNOSIS — I10 ESSENTIAL (PRIMARY) HYPERTENSION: ICD-10-CM

## 2024-12-20 DIAGNOSIS — E11.69 TYPE 2 DIABETES MELLITUS WITH OTHER SPECIFIED COMPLICATION: ICD-10-CM

## 2024-12-20 DIAGNOSIS — E78.1 PURE HYPERGLYCERIDEMIA: ICD-10-CM

## 2024-12-20 DIAGNOSIS — G47.00 INSOMNIA, UNSPECIFIED: ICD-10-CM

## 2024-12-20 PROCEDURE — G0439: CPT

## 2025-01-22 ENCOUNTER — RX RENEWAL (OUTPATIENT)
Age: 85
End: 2025-01-22

## 2025-04-18 ENCOUNTER — APPOINTMENT (OUTPATIENT)
Dept: INTERNAL MEDICINE | Facility: CLINIC | Age: 85
End: 2025-04-18
Payer: MEDICARE

## 2025-04-18 VITALS
OXYGEN SATURATION: 96 % | BODY MASS INDEX: 30.39 KG/M2 | TEMPERATURE: 97.7 F | RESPIRATION RATE: 16 BRPM | HEIGHT: 64 IN | DIASTOLIC BLOOD PRESSURE: 74 MMHG | HEART RATE: 78 BPM | SYSTOLIC BLOOD PRESSURE: 173 MMHG | WEIGHT: 178 LBS

## 2025-04-18 VITALS — DIASTOLIC BLOOD PRESSURE: 68 MMHG | SYSTOLIC BLOOD PRESSURE: 130 MMHG

## 2025-04-18 DIAGNOSIS — M85.80 OTHER SPECIFIED DISORDERS OF BONE DENSITY AND STRUCTURE, UNSPECIFIED SITE: ICD-10-CM

## 2025-04-18 DIAGNOSIS — E78.1 PURE HYPERGLYCERIDEMIA: ICD-10-CM

## 2025-04-18 DIAGNOSIS — I10 ESSENTIAL (PRIMARY) HYPERTENSION: ICD-10-CM

## 2025-04-18 DIAGNOSIS — E78.6 LIPOPROTEIN DEFICIENCY: ICD-10-CM

## 2025-04-18 DIAGNOSIS — N32.81 OVERACTIVE BLADDER: ICD-10-CM

## 2025-04-18 DIAGNOSIS — E11.9 TYPE 2 DIABETES MELLITUS W/OUT COMPLICATIONS: ICD-10-CM

## 2025-04-18 DIAGNOSIS — E66.9 TYPE 2 DIABETES MELLITUS WITH OTHER SPECIFIED COMPLICATION: ICD-10-CM

## 2025-04-18 DIAGNOSIS — E11.59 TYPE 2 DIABETES MELLITUS WITH OTHER SPECIFIED COMPLICATION: ICD-10-CM

## 2025-04-18 DIAGNOSIS — E66.9 OBESITY, UNSPECIFIED: ICD-10-CM

## 2025-04-18 DIAGNOSIS — R79.89 OTHER SPECIFIED ABNORMAL FINDINGS OF BLOOD CHEMISTRY: ICD-10-CM

## 2025-04-18 DIAGNOSIS — M77.50 OTHER ENTHESOPATHY OF UNSPCFD FOOT AND ANKLE: ICD-10-CM

## 2025-04-18 DIAGNOSIS — K76.0 FATTY (CHANGE OF) LIVER, NOT ELSEWHERE CLASSIFIED: ICD-10-CM

## 2025-04-18 DIAGNOSIS — I15.2 TYPE 2 DIABETES MELLITUS WITH OTHER SPECIFIED COMPLICATION: ICD-10-CM

## 2025-04-18 DIAGNOSIS — Z86.39 PERSONAL HISTORY OF OTHER ENDOCRINE, NUTRITIONAL AND METABOLIC DISEASE: ICD-10-CM

## 2025-04-18 DIAGNOSIS — G47.00 INSOMNIA, UNSPECIFIED: ICD-10-CM

## 2025-04-18 DIAGNOSIS — E11.69 TYPE 2 DIABETES MELLITUS WITH OTHER SPECIFIED COMPLICATION: ICD-10-CM

## 2025-04-18 PROCEDURE — 99214 OFFICE O/P EST MOD 30 MIN: CPT

## 2025-04-18 PROCEDURE — G2211 COMPLEX E/M VISIT ADD ON: CPT

## 2025-08-11 ENCOUNTER — RX RENEWAL (OUTPATIENT)
Age: 85
End: 2025-08-11

## 2025-08-28 ENCOUNTER — APPOINTMENT (OUTPATIENT)
Dept: INTERNAL MEDICINE | Facility: CLINIC | Age: 85
End: 2025-08-28
Payer: MEDICARE

## 2025-08-28 VITALS — SYSTOLIC BLOOD PRESSURE: 132 MMHG | DIASTOLIC BLOOD PRESSURE: 72 MMHG

## 2025-08-28 VITALS
DIASTOLIC BLOOD PRESSURE: 65 MMHG | TEMPERATURE: 97.7 F | HEIGHT: 64 IN | OXYGEN SATURATION: 95 % | BODY MASS INDEX: 29.37 KG/M2 | WEIGHT: 172 LBS | SYSTOLIC BLOOD PRESSURE: 153 MMHG | HEART RATE: 74 BPM | RESPIRATION RATE: 16 BRPM

## 2025-08-28 DIAGNOSIS — H93.8X1 OTHER SPECIFIED DISORDERS OF RIGHT EAR: ICD-10-CM

## 2025-08-28 DIAGNOSIS — M25.562 PAIN IN LEFT KNEE: ICD-10-CM

## 2025-08-28 DIAGNOSIS — E78.1 PURE HYPERGLYCERIDEMIA: ICD-10-CM

## 2025-08-28 DIAGNOSIS — N32.81 OVERACTIVE BLADDER: ICD-10-CM

## 2025-08-28 DIAGNOSIS — E66.9 OBESITY, UNSPECIFIED: ICD-10-CM

## 2025-08-28 DIAGNOSIS — E11.9 OBESITY, UNSPECIFIED: ICD-10-CM

## 2025-08-28 DIAGNOSIS — E78.6 LIPOPROTEIN DEFICIENCY: ICD-10-CM

## 2025-08-28 DIAGNOSIS — I10 OBESITY, UNSPECIFIED: ICD-10-CM

## 2025-08-28 DIAGNOSIS — R79.89 OTHER SPECIFIED ABNORMAL FINDINGS OF BLOOD CHEMISTRY: ICD-10-CM

## 2025-08-28 DIAGNOSIS — I10 ESSENTIAL (PRIMARY) HYPERTENSION: ICD-10-CM

## 2025-08-28 DIAGNOSIS — G47.00 INSOMNIA, UNSPECIFIED: ICD-10-CM

## 2025-08-28 DIAGNOSIS — Z86.39 PERSONAL HISTORY OF OTHER ENDOCRINE, NUTRITIONAL AND METABOLIC DISEASE: ICD-10-CM

## 2025-08-28 DIAGNOSIS — E11.9 TYPE 2 DIABETES MELLITUS W/OUT COMPLICATIONS: ICD-10-CM

## 2025-08-28 DIAGNOSIS — H61.21 IMPACTED CERUMEN, RIGHT EAR: ICD-10-CM

## 2025-08-28 DIAGNOSIS — K76.0 FATTY (CHANGE OF) LIVER, NOT ELSEWHERE CLASSIFIED: ICD-10-CM

## 2025-08-28 LAB
ALT SERPL-CCNC: 28
AST SERPL-CCNC: 24
CHOLEST SERPL-MCNC: 147
HBA1C MFR BLD HPLC: 8.1
HDLC SERPL-MCNC: 32
HDLC SERPL: 4.6
LDLC SERPL-MCNC: 71
TRIGL SERPL-MCNC: 270

## 2025-08-28 PROCEDURE — 99214 OFFICE O/P EST MOD 30 MIN: CPT

## 2025-08-28 PROCEDURE — G2211 COMPLEX E/M VISIT ADD ON: CPT

## 2025-08-28 RX ORDER — OMEGA-3/DHA/EPA/FISH OIL 300-1000MG
CAPSULE ORAL
Refills: 0 | Status: ACTIVE | COMMUNITY

## 2025-08-28 RX ORDER — TIRZEPATIDE 2.5 MG/.5ML
2.5 INJECTION, SOLUTION SUBCUTANEOUS
Refills: 0 | Status: ACTIVE | COMMUNITY

## 2025-08-31 PROBLEM — H61.21 IMPACTED CERUMEN OF RIGHT EAR: Status: ACTIVE | Noted: 2025-08-28

## 2025-08-31 PROBLEM — H93.8X1 SENSATION OF PLUGGED EAR ON RIGHT SIDE: Status: ACTIVE | Noted: 2025-08-28

## 2025-08-31 PROBLEM — E66.9 OBESITY, DIABETES, AND HYPERTENSION SYNDROME: Status: ACTIVE | Noted: 2024-05-06

## 2025-09-18 ENCOUNTER — RX RENEWAL (OUTPATIENT)
Age: 85
End: 2025-09-18